# Patient Record
Sex: MALE | Race: BLACK OR AFRICAN AMERICAN | NOT HISPANIC OR LATINO | Employment: UNEMPLOYED | URBAN - METROPOLITAN AREA
[De-identification: names, ages, dates, MRNs, and addresses within clinical notes are randomized per-mention and may not be internally consistent; named-entity substitution may affect disease eponyms.]

---
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
## 2018-01-08 ENCOUNTER — TELEPHONE (OUTPATIENT)
Dept: PSYCHIATRY | Facility: CLINIC | Age: 18
End: 2018-01-08

## 2018-01-08 NOTE — TELEPHONE ENCOUNTER
-Lona MEDINA informed writer pt's injection was coming to be due and he did not have an appt scheduled.   -Writer talked with pt and informed him that it was due.  -Pt did not have phone number for [facility] and asked if writer could text it to him.    -writer asked Yomaira LENTZ to text the number to him   -writer asked pt if he was able to make appt and ride for the appt.  -Pt assured writer that he could do it  -writer informed pt that she will check in with him tomorrow to make sure ride and appt are made.

## 2018-01-11 NOTE — TELEPHONE ENCOUNTER
-writer call pt to assist with making appointment for injection   -writer received voicemail   -writer left a message asking pt to call clinic   -provided clinic number

## 2018-01-12 NOTE — TELEPHONE ENCOUNTER
-writer called pt as she saw that pt had scheduled his injection appointment   -pt reported that he also scheduled his ride  -writer transferred him to the  to also make a follow up med appointment with ADAM Purvis

## 2018-01-15 ENCOUNTER — ALLIED HEALTH/NURSE VISIT (OUTPATIENT)
Dept: PSYCHIATRY | Facility: CLINIC | Age: 18
End: 2018-01-15
Payer: MEDICAID

## 2018-01-15 DIAGNOSIS — F25.1 SCHIZOAFFECTIVE DISORDER, DEPRESSIVE TYPE (H): Primary | ICD-10-CM

## 2018-01-15 DIAGNOSIS — Z79.899 ENCOUNTER FOR LONG-TERM (CURRENT) USE OF MEDICATIONS: ICD-10-CM

## 2018-01-15 PROCEDURE — 25000128 H RX IP 250 OP 636: Mod: ZF

## 2018-01-15 PROCEDURE — 96372 THER/PROPH/DIAG INJ SC/IM: CPT | Mod: ZF

## 2018-01-15 NOTE — NURSING NOTE
"Rohan Ruggiero comes into clinic today at the request of Lona Spann Ordering Provider for Med Injection only Juvenal Hauser.    OBSERVATIONS    Prior to administration pt identified by name and :  Yes    1.  Appearance:  Clean and casual dress.  2.  Mood:  \"ok\"  3.  Affect:  Mood congruent  4.  Interactions:  Appropriate.  Pt reports feeling ill yesterday with congestion and sore throat.  Feels better today.  Encouraged rest and fluids.  Suggested a hot shower or sitting in bathroom while shower runs in order to hydrate sinuses, as pt does not have a humidifier.  Also, discussed uncertainty for when SLP site will be able to administer injections.  Will tentatively schedule pt here for next injection.  5.  Eye contact:  Good  6.  Side Effects:  Denies  7.  Education:  See above  8.  Level of Cooperation:  Good  9.  Compliance:  Full  10.  Next appointment:  18- provider, 18- injection      This service provided today was under the supervising provider of the day Dr. Henry, who was available if needed.    Zeny Nova    "

## 2018-01-15 NOTE — MR AVS SNAPSHOT
After Visit Summary   1/15/2018    Rohan Ruggiero    MRN: [id number]           Patient Information     Date Of Birth          2000        Visit Information        Provider Department      1/15/2018 1:00 PM Nurse, [facility] Psychiatry Psychiatry Clinic        Today's Diagnoses     Schizoaffective disorder, depressive type (H)    -  1    Encounter for long-term (current) use of medications           Follow-ups after your visit        Your next 10 appointments already scheduled     Arley 15, 2018  1:00 PM CST   Nurse Visit with [facility] Psychiatry Nurse   Psychiatry Clinic ([facility])    [address]  [address] [facility] [address]   [phone]            Jan 18, 2018 11:00 AM CST   Adult Med Follow UP with ADAM Purvis CNP   Psychiatry Clinic ([facility])    [address] [facility] [address] [phone]            Feb 12, 2018  1:00 PM CST   Nurse Visit with [facility] Psychiatry Nurse   Psychiatry Clinic ([facility])    [address] [facility] [address] [phone]              Who to contact     Please call your clinic at [phone] to:    Ask questions about your health    Make or cancel appointments    Discuss your medicines    Learn about your test results    Speak to your doctor   If you have compliments or concerns about an experience at your clinic, or if you wish to file a complaint, please contact [facility] Patient Relations at [phone] or email us at [email or web address]         Additional Information About Your Visit        MyChart Information     [employer] gives you secure access to your electronic health record. If you see a primary care provider, you can also send messages to your care team and make appointments. If you have questions, please call your primary care clinic.  If you  do not have a primary care provider, please call [phone] and they will assist you.      Syscor is an electronic gateway that provides easy, online access to your medical records. With Syscor, you can request a clinic appointment, read your test results, renew a prescription or communicate with your care team.     To access your existing account, please contact your [facility] or call [phone] for assistance.        Care EveryWhere ID     This is your Care EveryWhere ID. This could be used by other organizations to access your [place] medical records  [id number]         Blood Pressure from Last 3 Encounters:   12/14/17 100/64   08/21/17 110/72   08/07/17 129/79    Weight from Last 3 Encounters:   12/14/17 89.6 kg (197 lb 9.6 oz) (92 %)*   08/21/17 89.2 kg (196 lb 9.6 oz) (92 %)*   08/07/17 88.9 kg (196 lb) (92 %)*     * Growth percentiles are based on [facility] 2-20 Years data.              Today, you had the following     No orders found for display       Primary Care Provider Fax #    Physician No Ref-Primary [phone]       No address on file        Equal Access to Services     MIRIAM ALFARO : Hadii nj [place], waaxda lutomaszadaha, qaybta kaalmada adegiseleda, gilbert aiken. So [facility] [phone].    ATENCIÓN: Si habla español, tiene a colon disposición servicios gratuitos de asistencia lingüística. Llame al [phone].    We comply with applicable federal civil rights laws and [place] laws. We do not discriminate on the basis of race, color, national origin, age, disability, sex, sexual orientation, or gender identity.            Thank you!     Thank you for choosing PSYCHIATRY CLINIC  for your care. Our goal is always to provide you with excellent care. Hearing back from our patients is one way we can continue to improve our services. Please take a few minutes to complete the written survey that you may receive in the mail after your  visit with us. Thank you!             Your Updated Medication List - Protect others around you: Learn how to safely use, store and throw away your medicines at www.disposemymeds.org.          This list is accurate as of: 1/15/18 12:49 PM.  Always use your most recent med list.                   Brand Name Dispense Instructions for use Diagnosis    acetaminophen 325 MG tablet    TYLENOL    50 tablet    Take 1-2 tablets by mouth every 4 hours as needed for pain.    Balanitis       ibuprofen 600 MG tablet    ADVIL/MOTRIN    50 tablet    Take 1 tablet by mouth every 6 hours as needed for pain.    Balanitis       paliperidone 156 MG/ML Susp injection    INVEGA SUSTENNA    1 mL    Inject 1 mL (156 mg) into the muscle once for 1 dose Every 4 weeks    Schizoaffective disorder, depressive type (H)       risperiDONE 0.5 MG tablet    risperDAL    30 tablet    Take 1 tablet (0.5 mg) by mouth At Bedtime    Schizoaffective disorder, depressive type (H)       traZODone 50 MG tablet    DESYREL    60 tablet    Take one (1) to two (2) tabs at night as needed    Schizoaffective disorder, depressive type (H)

## 2018-01-18 ENCOUNTER — OFFICE VISIT (OUTPATIENT)
Dept: PSYCHIATRY | Facility: CLINIC | Age: 18
End: 2018-01-18
Attending: PSYCHIATRY & NEUROLOGY
Payer: MEDICAID

## 2018-01-18 VITALS
DIASTOLIC BLOOD PRESSURE: 61 MMHG | BODY MASS INDEX: 25.69 KG/M2 | HEART RATE: 82 BPM | SYSTOLIC BLOOD PRESSURE: 95 MMHG | WEIGHT: 197.4 LBS

## 2018-01-18 DIAGNOSIS — F25.1 SCHIZOAFFECTIVE DISORDER, DEPRESSIVE TYPE (H): Primary | ICD-10-CM

## 2018-01-18 PROCEDURE — G0463 HOSPITAL OUTPT CLINIC VISIT: HCPCS | Mod: ZF

## 2018-01-18 ASSESSMENT — PAIN SCALES - GENERAL: PAINLEVEL: NO PAIN (0)

## 2018-01-18 NOTE — NURSING NOTE
Chief Complaint   Patient presents with     Recheck Medication     Schizoaffective disorder, depressive type     Reviewed Allergies, Medications, Pharmacy, Smoking Status, and Pain Level   Obtained Weight, Blood Pressure, Heart Rate

## 2018-01-18 NOTE — MR AVS SNAPSHOT
After Visit Summary   1/18/2018    Rohan Ruggiero    MRN: [id number]           Patient Information     Date Of Birth          2000        Visit Information        Provider Department      1/18/2018 11:00 AM Lona Spann APRN CNP Psychiatry Clinic        Today's Diagnoses     Schizoaffective disorder, depressive type (H)    -  1      Care Instructions    Invega Sustenna 156mg qmonthly. Next appt is 2/12/18          Follow-ups after your visit        Follow-up notes from your care team     Return in about 1 month (around 2/18/2018).      Your next 10 appointments already scheduled     Feb 12, 2018  1:00 PM CST   Nurse Visit with [facility] Psychiatry Nurse   Psychiatry Clinic ([facility])    [address] [facility] [address] [phone]            Mar 22, 2018 11:00 AM CDT   Adult Med Follow UP with ADAM Purvis CNP   Psychiatry Clinic ([facility])    [address] [facility] [address] [phone]              Who to contact     Please call your clinic at [phone] to:    Ask questions about your health    Make or cancel appointments    Discuss your medicines    Learn about your test results    Speak to your doctor   If you have compliments or concerns about an experience at your clinic, or if you wish to file a complaint, please contact [facility] Patient Relations at [phone] or email us at [email or web address]         Additional Information About Your Visit        Phyllishart Information     Ybrainhart gives you secure access to your electronic health record. If you see a primary care provider, you can also send messages to your care team and make appointments. If you have questions, please call your primary care clinic.  If you do not have a primary care provider, please call [phone] and they will assist you.      Ghazala is an  electronic gateway that provides easy, online access to your medical records. With [employer] (FIG), you can request a clinic appointment, read your test results, renew a prescription or communicate with your care team.     To access your existing account, please contact your [facility] or call [phone] for assistance.        Care EveryWhere ID     This is your Care EveryWhere ID. This could be used by other organizations to access your [facility] medical records  [id number]        Your Vitals Were     Pulse BMI (Body Mass Index)                82 25.69 kg/m2           Blood Pressure from Last 3 Encounters:   01/18/18 95/61   12/14/17 100/64   08/21/17 110/72    Weight from Last 3 Encounters:   01/18/18 89.5 kg (197 lb 6.4 oz) (92 %)*   12/14/17 89.6 kg (197 lb 9.6 oz) (92 %)*   08/21/17 89.2 kg (196 lb 9.6 oz) (92 %)*     * Growth percentiles are based on [facility] 2-20 Years data.              Today, you had the following     No orders found for display       Primary Care Provider Fax #    Physician No Ref-Primary [phone]       No address on file        Equal Access to Services     DAYRON ALFARO : Hadii nj [place], waalexanderda martin, qaybta kaalmada penelope, gilbert claros . So [facility] [phone].    ATENCIÓN: Si habla español, tiene a colon disposición servicios gratuitos de asistencia lingüística. Marlenyame al [phone].    We comply with applicable federal civil rights laws and [place] laws. We do not discriminate on the basis of race, color, national origin, age, disability, sex, sexual orientation, or gender identity.            Thank you!     Thank you for choosing PSYCHIATRY CLINIC  for your care. Our goal is always to provide you with excellent care. Hearing back from our patients is one way we can continue to improve our services. Please take a few minutes to complete the written survey that you may receive in the mail after your visit with us.  Thank you!             Your Updated Medication List - Protect others around you: Learn how to safely use, store and throw away your medicines at www.disposemymeds.org.          This list is accurate as of: 1/18/18 11:52 AM.  Always use your most recent med list.                   Brand Name Dispense Instructions for use Diagnosis    acetaminophen 325 MG tablet    TYLENOL    50 tablet    Take 1-2 tablets by mouth every 4 hours as needed for pain.    Balanitis       ibuprofen 600 MG tablet    ADVIL/MOTRIN    50 tablet    Take 1 tablet by mouth every 6 hours as needed for pain.    Balanitis       paliperidone 156 MG/ML Susp injection    INVEGA SUSTENNA    1 mL    Inject 1 mL (156 mg) into the muscle once for 1 dose Every 4 weeks    Schizoaffective disorder, depressive type (H)       risperiDONE 0.5 MG tablet    risperDAL    30 tablet    Take 1 tablet (0.5 mg) by mouth At Bedtime    Schizoaffective disorder, depressive type (H)       traZODone 50 MG tablet    DESYREL    60 tablet    Take one (1) to two (2) tabs at night as needed    Schizoaffective disorder, depressive type (H)
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
## 2018-01-19 ASSESSMENT — PATIENT HEALTH QUESTIONNAIRE - PHQ9: SUM OF ALL RESPONSES TO PHQ QUESTIONS 1-9: 0

## 2018-01-23 NOTE — PROGRESS NOTES
[facility] NAVIGATE Program Treatment Plan Summary  A Part of the [facility] First Episode of Psychosis Program     NAVIGATE Enrollee: Rohan Ruggiero  /Age:  2000 (17 year old)    Date of Initial Service: 3/29/17  Date of INTIAL Treatment Plan: 17  Last Review/Update Date:  10/18/17   90-Day Review Date: 18    The following is a REVIEWED treatment plan?  Yes   The following is an UPDATED treatment plan?  Yes    Participants at Collaborative Treatment Planning Meeting:   Client    NAVIGATE IRT Clinician: ABRAM Tejada    1. DSM-V Diagnosis (include numeric code)  Schizoaffective disorder, depressed type (F25.1)    2. Current symptoms and circumstances that substantiate the diagnosis:  Rohan is experiencing visual and auditory hallucinations, as well as paranoia and delusional thinking.    3. How symptoms and/or behaviors are affecting level of function:   Rohan is unable to fully engage in school/work, social activities, family relationships, or his community.    4. Risk Assessment:  Suicide:  Assessed Level of Immediate Risk: Low  Ideation: No  Plan:  No  Means: No  Intent: No    Homicide/Violence:  Assessed Level of Immediate Risk: Low  Ideation: No  Plan: No  Means: No  Intent: No    If on a medication, please include name and dosage:    Current Outpatient Prescriptions   Medication     paliperidone (INVEGA SUSTENNA) 156 MG/ML SUSP injection     risperiDONE (RISPERDAL) 0.5 MG tablet     traZODone (DESYREL) 50 MG tablet     acetaminophen (TYLENOL) 325 MG tablet     ibuprofen (ADVIL,MOTRIN) 600 MG tablet     No current facility-administered medications for this visit.        5. Treatment Goals    Domain: Illness Management & Recovery  Goal: Identify and engage possible areas of improvement related to +/- symptoms, ability to manage illness, medications, and/or substance use/abuse, SI/SIB/HI    Objectives & Target Date     - Continue with mediation recommendations, Target Date: 18  - Complete a  safety plan with therapist and share with support system, Target Date: 1/18/18  - Verbalize an understanding of the motives for self-destructive behavior patterns, Target Date: 1/18/18  - Verbally report and demonstrate an increased sense of hope for self, Target Date: 1/18/18   - Identify positive traits and talents about self, Target Date: 1/18/18   - Identify negative automatic thoughts and replace them with positive self-talk messages to build self-esteem, Target Date: 1/18/18   - Verbalize an understanding of the underlying needs, conflicts, and emotions that support irrational beliefs, Target Date: 1/18/18    - Verbally identify the stressors that contribute to the reactive psychosis, Target Date: 1/18/18   - Report a diminishing or absence of hallucinations and/or delusions, Target Date: 1/18/18   - Decrease the frequency of somatic complaints, Target Date: 1/18/18   - Develop and implement relapse prevention strategies for managing possible future episodes of psychosis, Target Date: 1/18/18  - Increase communication with support system, resulting in feeling attended to and understood, Target Date: 1/18/18  - Learn and implement problem-solving and/or conflict resolution skills to manage personal and interpersonal problems constructively, Target Date: 1/18/18  - Family members verbalize increased understanding of an knowledge about Rohan s illness and treatment, Target Date: 1/18/18   - Family members terminate any hostile, critical responses to Rohan and increase their statements of praise, optimism, and affirmation, Target Date: 1/18/18   - Family members identify specific activities for Rohan that will facilitate development of positive self-esteem, Target Date: 1/18/18   - Family members verbalize realistic expectations of Rohan, Target Date: 1/18/18  - Learn and implement calming, communication, conflict resolution, and thinking skills as part of a new way to manage reactions to frustration, Target Date:  1/18/18    Strengths   - Bravery & Valor     - Caution, Prudence, & Discretion    - Curiosity    - Honest, Authentic, Genuine    - Industry, diligence, & perseverance    - Kind & Generous    - Modesty & Humility    - Self-control & Self-regulation    - Social Intelligence    - Spirituality, Sense of Purpose, Tea      Barriers  - Command Hallucinations  - Communication, limited to no communication with family/support network  - Coping, limited to no coping strategies  - Depression and/or Hopelessness  - Drug/Alcohol use/abuse/dependence  - Environmental Stress (e.g. family conflict or criticism) (S)  - Grandiose or Persecutory Delusions (S)  - Impulsive  - Isolation/Reduced Supervision (S)  - Male (S)  - Resources, limited to no financial, social, friend, familial, resource support   - SI/SIB/HI  - Single (S)  - Symptoms of psychosis, positive (delusions, hallucinations)  - Symptoms of psychosis, negative (flat affect, avolition, anhedonia, alogia, and/or apathy)  - Treatment Non-Adherence     Provider & Intervention  IRT  - Motivational Interviewing (Connect info and skills with personal goals, Promote hope and positive expectations, Explore pros and cons of change, Re-frame experiences in a positive light), Provided by: Yomaira Roberts  - Educational Teaching Strategies (Review written material/education on: Assessment/Initial Goal Setting, Education about Psychosis, Relapse Prevention Planning, Processing the Psychotic Episode, Developing Resiliency -Standard Sessions, Building a Bridging to Your Goals, Dealing with Negative Feelings, Coping with Symptoms, Substance Use, Having Fun and Developing Good Relationships, Making Choices about Smoking, Nutrition and Exercise, Developing Resiliency), Provided by: Yomaira Roberts  - CBT (Reinforcement and shaping, Social skills training, Relapse prevention planning, Coping skills training, Relaxation training, Cognitive restructuring, Behavioral tailoring), Provided by: Yomaira  Armando      Domain: Health & Basic Living Needs  Goal: Identify and engage possible areas of improvement related to basic needs being met and maintaining or improving overall health and well-being    Objectives & Target Date     - Learn and implement healthy nutritional practices, Target Date: 1/18/18   - Reestablish a consistent eating and sleeping pattern, Target Date: 1/18/18   - Establish a plan to increase physical exercise, Target Date: 1/18/18  - Explore motivation for treatment toward making a commitment to change, Target Date: 1/18/18   - Decrease the level of denial around using substances as evidenced by fewer statements about minimizing amount of use and its negative impact on life, Target Date: 1/18/18   - Verbalize increased knowledge of substance use and the process of recovery, Target Date: 1/18/18   - Verbalize a commitment to a harm reduction approach to using substances, Target Date: 1/18/18   - Verbalize an understanding of personal, social, and family factors that can contribute to development of chemical dependence and pose risks for relapse, Target Date: 1/18/18   - Identify and make changes in social relationships that will support recovery, Target Date: 1/18/18   - Learn and implement coping strategies to manage urges to use substances, Target Date: 1/18/18  - Implement relapse prevention strategies for managing possible future situations with high risk for relapse, Target Date: 1/18/18   - Develop a written aftercare plan that will support the maintenance of long-term sobriety, Target Date: 1/18/18  - Family members demonstrate support of Rohan's sobriety by minimizing environmental triggers for future substance use, Target Date: 1/18/18  - Family members agree to not financially support Rohan's substance use, Target Date: 1/18/18  - Family members verbally reinforce Rohan's active attempts to decrease substance by providing praise, encouragement, and affirmations, Target Date: 1/18/18      Strengths   - Caution, Prudence, & Discretion    - Curiosity    - Hope, Optimism, & future-mindedness    - Honest, Authentic, Genuine    - Humor & Playfulness     - Judgment, critical thinking, & open-mindedness    - Kind & Generous    - Self-control & Self-regulation    - Social Intelligence    - Spirituality, Sense of Purpose, Tea      Barriers   - Command Hallucinations  - Communication, limited to no communication with family/support network  - Coping, limited to no coping strategies  - Depression and/or Hopelessness  - Drug/Alcohol use/abuse/dependence  - Environmental Stress (e.g. family conflict or criticism) (S)  - Grandiose or Persecutory Delusions (S)  - Impulsive  - Isolation/Reduced Supervision (S)  - Male (S)  - Resources, limited to no financial, social, friend, familial, resource support   - SI/SIB/HI  - Single (S)  - Symptoms of psychosis, positive (delusions, hallucinations)  - Symptoms of psychosis, negative (flat affect, avolition, anhedonia, alogia, and/or apathy)  - Treatment Non-Adherence     Provider & Intervention  Prescriber  - Ongoing Nutrition/Exercise Education, Provided by: Yomaira Roberts and Lona Spann  - Side Effect Monitoring, Provided by: Lona Spann      Domain: Family & Other Supports  Goal: Identify and engage possible areas of improvement related to engaging family, friends and other supports    Objectives & Target Date     - Identify strengths and interests that can be used to initiate social contacts and develop peer friendships, Target Date: 1/18/18  - Learn and implement calming and coping strategies to manage anxiety symptoms and focus attention usefully during moments of social anxiety, Target Date: 1/18/18    - Strengthen the social support network with friends by initiating social contact and participating in social activities with peers, Target Date: 1/18/18   - Participate in family therapy, Target Date: 1/18/18  - Increase the frequency of positive  interactions with support network, Target Date: 1/18/18  - Increase participation in interpersonal or peer group activities, Target Date: 1/18/18   - Family members teach and reinforce healthy social skills and attitudes, Target Date: 1/18/18     Strengths  - Capacity to love and be loved    - Caution, Prudence, & Discretion    - Gratitude     - Hope, Optimism, & future-mindedness    - Honest, Authentic, Genuine    - Humor & Playfulness     - Industry, diligence, & perseverance    - Judgment, critical thinking, & open-mindedness    - Kind & Generous    - Modesty & Humility    - Perspective ([place])    - Social Intelligence    - Spirituality, Sense of Purpose, Tea    - Teamwork & Loyalty      Barriers   - Command Hallucinations  - Communication, limited to no communication with family/support network  - Coping, limited to no coping strategies  - Depression and/or Hopelessness  - Drug/Alcohol use/abuse/dependence  - Environmental Stress (e.g. family conflict or criticism) (S)  - Grandiose or Persecutory Delusions (S)  - Impulsive  - Isolation/Reduced Supervision (S)  - Resources, limited to no financial, social, friend, familial, resource support  - Symptoms of psychosis, positive (delusions, hallucinations)  - Symptoms of psychosis, negative (flat affect, avolition, anhedonia, alogia, and/or apathy)  - Treatment Non-Adherence     Provider & Intervention  Family Therapy  - Motivational Interviewing (Connect info and skills with personal goals, Promote hope and positive expectations, Explore pros and cons of change, Re-frame experiences in a positive light), Provided by: Wil Gaines  - Educational Teaching Strategies (Review written material/education on: Psychosis, Medications for psychosis, Coping with stress, Strategies to build resiliency, Relapse prevention planning, Developing a collaboration with mental health professionals, Effective communication, A relative s guide to supporting recovery from psychosis,  Basic facts about alcohol and drugs), Provided by: Wil Gaines  - CBT (Reinforcement and shaping, Social skills training, Relapse prevention planning, Coping skills training, Relaxation training, Cognitive restructuring, Behavioral tailoring), Provided by: Wil Gaines      Domain: Social, Academic, & Employment  Goal: Identify and engage possible areas of improvement related to education, employment, and social activities     Objectives & Target Date     - Explore areas of interest for employment purposes, Target Date: 1/18/18  - Obtain/maintain gainful employment, Target Date: 1/18/18   - Increase participation in interpersonal or peer group activities, Target Date: 1/18/18  - Increase frequency of completion of chores and household responsibilities, Target Date: 1/18/18  - Family members provide praise, encouragement for Rohan's attempts and successes at school/work, Target Date: 1/18/18     Strengths  - Capacity to love and be loved    - Caution, Prudence, & Discretion    - Creativity, Ingenuity, & Originality    - Hope, Optimism, & future-mindedness    - Honest, Authentic, Genuine    - Humor & Playfulness     - Kind & Generous    - Modesty & Humility    - Perspective ([place])    - Self-control & Self-regulation    - Social Intelligence    - Spirituality, Sense of Purpose, Tea    - Teamwork & Loyalty      Barriers  - Command Hallucinations  - Communication, limited to no communication with family/support network  - Coping, limited to no coping strategies  - Depression and/or Hopelessness  - Drug/Alcohol use/abuse/dependence  - Environmental Stress (e.g. family conflict or criticism) (S)  - Grandiose or Persecutory Delusions (S)  - Impulsive  - Isolation/Reduced Supervision (S)  - Resources, limited to no financial, social, friend, familial, resource support   - Symptoms of psychosis, positive (delusions, hallucinations)  - Symptoms of psychosis, negative (flat affect, avolition, anhedonia, alogia, and/or  "apathy)  - Symptoms of psychosis, cognitive (memory, attention and concentration, and/or executive functioning difficulties)  - Treatment Non-Adherence     Provider & Intervention   SEE  - Identify Level of Disclosure, Provided by: Abby Tilley   - Education Assistance & Supports (Discuss/plan use of student disability services, School searching, Interview preparation/skills training, Complete forms/applications, Follow along supports for requesting accommodations, development and use of natural supports, problem solving difficulties, stress management, develop/use of coping skills for symptoms, develop/use of coping skills for cognitive difficulties, social skills training), Provided by: Abby Tilley  - Employment Assistance & Supports (Information gathering/planning re: \"benefits applications\" or \"work incentive planning\", Job searching, Interview preparation/skills training, Complete resume, Complete applications, Follow along supports for requesting accommodations, development and use of natural supports, problem solving difficulties, stress management, develop/use of coping skills for symptoms, develop/use of coping skills for cognitive difficulties, social skills training), Provided by: Abby Tilley    6. Frequency of Sessions: Weekly    7. Expected duration of treatment:  1 year    8. Participants in therapy plan (family, friends, support network): Family, Abby Tilley (SEE), Yomaira Roberts (IRT), Wil Gaines (Family Clinician)      See scanned document for Acknowledgement of Current Treatment Plan    Regulatory Guidelines for Updating Treatment Plan  [place] Medical Assistance: Reviewed & signed at least every 90 days  Medicare:  Update per policy  "

## 2018-02-15 ENCOUNTER — TELEPHONE (OUTPATIENT)
Dept: PSYCHIATRY | Facility: CLINIC | Age: 18
End: 2018-02-15

## 2018-02-15 NOTE — TELEPHONE ENCOUNTER
-writer called and left a message with pt's foster mother as his phone number is not currently working.

## 2018-02-15 NOTE — TELEPHONE ENCOUNTER
-writer called and spoke with Pt regarding his missed injection appointment on the number provided by Lita.   -Pt had forgotten had had the appointment, writer provided the number for [facility] to make an appointment   -Pt stated that he will make the appointment.

## 2018-02-15 NOTE — TELEPHONE ENCOUNTER
-Writer called Lita back and was able to talk to her about pt.   -Lita stated that pt got a new number [phone] and that writer can call him on this number to discuss his appointments.

## 2018-02-15 NOTE — TELEPHONE ENCOUNTER
-writer noted that pt missed injection appointment on 2/12.   -writer attempted to call pt to ask him to reschedule an appointment to have injection done.    -writer's phone number is currently not accepting calls.

## 2018-02-15 NOTE — TELEPHONE ENCOUNTER
Maninder iWlliam LPN Rosewall Abby; Wil Gaines, [facility]; Lona Grady, RN                     Caller: Manisha Calvert     Relationship to Patient: foster?     Call Back Number: [phone]     Reason for Call: Manisha states Abby called her but that was all the message that she heard. Rohan has not lived with her since 11/1/17 though she still sees him all the time. If there is information you need, she might be able to help but wanted you to know this information. She is a teacher and unable to take calls during the day. Stated you could call back after 4pm if you needed something.   Thank you

## 2018-02-21 NOTE — TELEPHONE ENCOUNTER
-writer noted that pt still had not made an appointment for injection.  Writer called pt and talked with pt regarding his injection appointment.  Writer asked pt if he needed assistance in making appointment.  Pt stated that he does not need help, that he just forgot to make his appointment.  Writer again gave the phone number of clinic to make appointment.  Pt thanked writer for the reminder.

## 2018-03-01 ENCOUNTER — ALLIED HEALTH/NURSE VISIT (OUTPATIENT)
Dept: PSYCHIATRY | Facility: CLINIC | Age: 18
End: 2018-03-01
Payer: MEDICAID

## 2018-03-01 DIAGNOSIS — F25.1 SCHIZOAFFECTIVE DISORDER, DEPRESSIVE TYPE (H): Primary | ICD-10-CM

## 2018-03-01 PROCEDURE — 96372 THER/PROPH/DIAG INJ SC/IM: CPT | Mod: ZF

## 2018-03-01 PROCEDURE — 25000128 H RX IP 250 OP 636: Mod: ZF

## 2018-03-01 NOTE — NURSING NOTE
Writer has not heard back from Lona Grady at this time.    Pt informed that there was uncertainty if orders will be approved.  Provided pt with contact information for [facility] near his house and recommended that he contact them directly to inquire.  Pt agreeable to this.

## 2018-03-01 NOTE — MR AVS SNAPSHOT
After Visit Summary   3/1/2018    Rohan Ruggiero    MRN: [id number]           Patient Information     Date Of Birth          2000        Visit Information        Provider Department      3/1/2018 1:00 PM Nurse, [facility] Psychiatry Psychiatry Clinic        Today's Diagnoses     Schizoaffective disorder, depressive type (H)    -  1       Follow-ups after your visit        Your next 10 appointments already scheduled     Mar 22, 2018 11:00 AM CDT   Adult Med Follow UP with ADAM Purvis [facility]   Psychiatry Clinic ([facility])    [address]  [address] [facility] [id number] [phone]              Who to contact     Please call your clinic at [phone] to:    Ask questions about your health    Make or cancel appointments    Discuss your medicines    Learn about your test results    Speak to your doctor            Additional Information About Your Visit        MyChart Information     [employer] gives you secure access to your electronic health record. If you see a primary care provider, you can also send messages to your care team and make appointments. If you have questions, please call your primary care clinic.  If you do not have a primary care provider, please call [phone] and they will assist you.      [employer] is an electronic gateway that provides easy, online access to your medical records. With [employer], you can request a clinic appointment, read your test results, renew a prescription or communicate with your care team.     To access your existing account, please contact your [facility] or call [phone] for assistance.        Care EveryWhere ID     This is your Care EveryWhere ID. This could be used by other organizations to access your [place] medical records  [id number]         Blood Pressure from Last 3 Encounters:   01/18/18 95/61   12/14/17 100/64   08/21/17 110/72    Weight from Last 3 Encounters:    01/18/18 89.5 kg (197 lb 6.4 oz) (92 %)*   12/14/17 89.6 kg (197 lb 9.6 oz) (92 %)*   08/21/17 89.2 kg (196 lb 9.6 oz) (92 %)*     * Growth percentiles are based on [facility] 2-20 Years data.              Today, you had the following     No orders found for display       Primary Care Provider Fax #    Physician No Ref-Primary [phone]       No address on file        Equal Access to Services     MIRIAM ALFARO : Hadii aad ku hadasho Soomaali, waaxda luqadaha, qaybta kaalmada adeegyada, gilbert claros . So [facility] [phone].    ATENCIÓN: Si habla español, tiene a colon disposición servicios gratuitos de asistencia lingüística. Llame al [phone].    We comply with applicable federal civil rights laws and [place] laws. We do not discriminate on the basis of race, color, national origin, age, disability, sex, sexual orientation, or gender identity.            Thank you!     Thank you for choosing PSYCHIATRY CLINIC  for your care. Our goal is always to provide you with excellent care. Hearing back from our patients is one way we can continue to improve our services. Please take a few minutes to complete the written survey that you may receive in the mail after your visit with us. Thank you!             Your Updated Medication List - Protect others around you: Learn how to safely use, store and throw away your medicines at www.disposemymeds.org.          This list is accurate as of 3/1/18  1:29 PM.  Always use your most recent med list.                   Brand Name Dispense Instructions for use Diagnosis    acetaminophen 325 MG tablet    TYLENOL    50 tablet    Take 1-2 tablets by mouth every 4 hours as needed for pain.    Balanitis       ibuprofen 600 MG tablet    ADVIL/MOTRIN    50 tablet    Take 1 tablet by mouth every 6 hours as needed for pain.    Balanitis       paliperidone 156 MG/ML Susp injection    INVEGA SUSTENNA    1 mL    Inject 1 mL (156 mg) into the muscle once for 1 dose Every 4  weeks    Schizoaffective disorder, depressive type (H)       risperiDONE 0.5 MG tablet    risperDAL    30 tablet    Take 1 tablet (0.5 mg) by mouth At Bedtime    Schizoaffective disorder, depressive type (H)       traZODone 50 MG tablet    DESYREL    60 tablet    Take one (1) to two (2) tabs at night as needed    Schizoaffective disorder, depressive type (H)

## 2018-03-01 NOTE — NURSING NOTE
"Rohan Ruggiero comes into clinic today at the request of Lona Spann Ordering Provider for Med Injection only Juvenal Hauser.    OBSERVATIONS    Prior to administration pt identified by name and :  Yes    1.  Appearance:  Casual dress and weather appropriate.  2.  Mood:  \"good\"  3.  Affect:  Mood congruent  4.  Interactions:  Appropriate.  Pt late for injection by ~2 weeks.  Reports stability of mood and psychosis sx.  Requests STD testing as had previously had in clinic.  Denies any current sx of concern.  Writer stated would attempt to reach his SLP providers.  Call placed following injection to RN- Lona Grady who is reaching out to covering provider Dr. Dunbar.  5.  Eye contact:  Good  6.  Side Effects:  Denies  7.  Education:  Good  8.  Level of Cooperation:  Full  9.  Compliance:  Full  10.  Next appointment:  3/22/18- Zana    This service provided today was under the supervising provider of the day Dr. Henry, who was available if needed.    Zeny Nova    "

## 2018-04-03 ENCOUNTER — TELEPHONE (OUTPATIENT)
Dept: PSYCHIATRY | Facility: CLINIC | Age: 18
End: 2018-04-03

## 2018-04-03 NOTE — TELEPHONE ENCOUNTER
-Writer called and spoke with Rohan regarding his appointments.    -Rohan was aware that he missed his appointment on Friday and reported that he did try to call [facility] to find out when his appointment was, but he was on hold for a long period of time so he eventually hung up.  -Writer also let him know he missed appointment on 3/22 with Lona Spann, Rohan reported that he did not get a reminder phone call, encouraged him to call and reschedule appointments for injection and med appointment and speak with clinic staff there about reminder phone calls, as writer is at a different clinic.   -Rohan reported that he will call clinic and make appointments.

## 2018-04-06 ENCOUNTER — ALLIED HEALTH/NURSE VISIT (OUTPATIENT)
Dept: PSYCHIATRY | Facility: CLINIC | Age: 18
End: 2018-04-06
Payer: MEDICAID

## 2018-04-06 DIAGNOSIS — Z79.899 ENCOUNTER FOR LONG-TERM (CURRENT) USE OF MEDICATIONS: ICD-10-CM

## 2018-04-06 DIAGNOSIS — F25.1 SCHIZOAFFECTIVE DISORDER, DEPRESSIVE TYPE (H): Primary | ICD-10-CM

## 2018-04-06 PROCEDURE — 96372 THER/PROPH/DIAG INJ SC/IM: CPT | Mod: ZF

## 2018-04-06 PROCEDURE — 25000128 H RX IP 250 OP 636: Mod: ZF

## 2018-04-06 NOTE — NURSING NOTE
"Rohan Ruggiero comes into clinic today at the request of Lona Spann Ordering Provider for Med Injection only Invega Sustenna 156 mg.    OBSERVATIONS    Prior to administration pt identified by name and :  Yes    1.  Appearance:  Clean and casual dress, hair growing out.  2.  Mood:  \"good\"  3.  Affect:  Restricted  4.  Interactions:  Appropriate.  Pt asks about \"blood test\" for STI's.  Writer reminded pt of last discussion of this.  [facility] with [facility].  Denies a need for urgent testing, denies any sx.  Plans to discuss at next appt with Lona Spann.  Pt mentions missed appts as did not receive reminder calls.  Pt's phone number under demographics updated.  Scheduling assisted with text alert reminders.  5.  Eye contact:  Good  6.  Side Effects:  Denies  7.  Education:  None needed  8.  Level of Cooperation:  Good  9.  Compliance:  Full  10.  Next appointment:  5/3/18- provider, injection      This service provided today was under the supervising provider of the day Dr. Henry, who was available if needed.    Zeny Nova    "
Normal

## 2018-04-06 NOTE — MR AVS SNAPSHOT
After Visit Summary   4/6/2018    Rohan Ruggiero    MRN: [id number]           Patient Information     Date Of Birth          2000        Visit Information        Provider Department      4/6/2018 1:30 PM Nurse, [email or web address] Psychiatry Psychiatry Clinic        Today's Diagnoses     Schizoaffective disorder, depressive type (H)    -  1    Encounter for long-term (current) use of medications           Follow-ups after your visit        Your next 10 appointments already scheduled     May 03, 2018 12:30 PM CDT   Adult Med Follow UP with ADAM Purvis CNP   Psychiatry Clinic ([facility])    [place] [id number] [address] [phone]            May 03, 2018  1:00 PM CDT   Nurse Visit with [email or web address] Psychiatry Nurse   Psychiatry Clinic ([facility])    [place] [id number]  [facility] [address] [phone]              Who to contact     Please call your clinic at [phone] to:    Ask questions about your health    Make or cancel appointments    Discuss your medicines    Learn about your test results    Speak to your doctor            Additional Information About Your Visit        [employer] Information     [employer] gives you secure access to your electronic health record. If you see a primary care provider, you can also send messages to your care team and make appointments. If you have questions, please call your primary care clinic.  If you do not have a primary care provider, please call [phone] and they will assist you.      [employer] is an electronic gateway that provides easy, online access to your medical records. With [employer], you can request a clinic appointment, read your test results, renew a prescription or communicate with your care team.     To access your existing account, please contact your [facility] or call [phone] for assistance.         Care EveryWhere ID     This is your Care EveryWhere ID. This could be used by other organizations to access your [place] medical records  [id number]         Blood Pressure from Last 3 Encounters:   01/18/18 95/61   12/14/17 100/64   08/21/17 110/72    Weight from Last 3 Encounters:   01/18/18 89.5 kg (197 lb 6.4 oz) (92 %)*   12/14/17 89.6 kg (197 lb 9.6 oz) (92 %)*   08/21/17 89.2 kg (196 lb 9.6 oz) (92 %)*     * Growth percentiles are based on [facility] 2-20 Years data.              Today, you had the following     No orders found for display       Primary Care Provider Fax #    Physician No Ref-Primary [phone]       No address on file        Equal Access to Services     MIRIAM ALFARO : Garo York, wajason salazar, qajoslyn kaalmajustin negrete, gilbert claros . So [facility] [phone].    ATENCIÓN: Si habla español, tiene a colon disposición servicios gratuitos de asistencia lingüística. Marlenyame al [phone].    We comply with applicable federal civil rights laws and [place] laws. We do not discriminate on the basis of race, color, national origin, age, disability, sex, sexual orientation, or gender identity.            Thank you!     Thank you for choosing PSYCHIATRY CLINIC  for your care. Our goal is always to provide you with excellent care. Hearing back from our patients is one way we can continue to improve our services. Please take a few minutes to complete the written survey that you may receive in the mail after your visit with us. Thank you!             Your Updated Medication List - Protect others around you: Learn how to safely use, store and throw away your medicines at www.disposemymeds.org.          This list is accurate as of 4/6/18  1:36 PM.  Always use your most recent med list.                   Brand Name Dispense Instructions for use Diagnosis    acetaminophen 325 MG tablet    TYLENOL    50 tablet    Take 1-2 tablets by mouth every 4 hours as needed for  pain.    Balanitis       ibuprofen 600 MG tablet    ADVIL/MOTRIN    50 tablet    Take 1 tablet by mouth every 6 hours as needed for pain.    Balanitis       paliperidone 156 MG/ML Susp injection    INVEGA SUSTENNA    1 mL    Inject 1 mL (156 mg) into the muscle once for 1 dose Every 4 weeks    Schizoaffective disorder, depressive type (H)       risperiDONE 0.5 MG tablet    risperDAL    30 tablet    Take 1 tablet (0.5 mg) by mouth At Bedtime    Schizoaffective disorder, depressive type (H)       traZODone 50 MG tablet    DESYREL    60 tablet    Take one (1) to two (2) tabs at night as needed    Schizoaffective disorder, depressive type (H)

## 2018-05-11 ENCOUNTER — TELEPHONE (OUTPATIENT)
Dept: PSYCHIATRY | Facility: CLINIC | Age: 18
End: 2018-05-11

## 2018-05-11 NOTE — TELEPHONE ENCOUNTER
-Writer attempted to call patient as he missed med appointment and injection appointment with ADAM Purvis PMHNP-BC yesterday.  Writer received voicemail.  Left message asking patient to return call to clinic.

## 2018-05-21 NOTE — TELEPHONE ENCOUNTER
-Called and spoke with Rohan.  He is still interested in receiving services, but was busy due to a move.  He informed writer that he will call tomorrow morning and make an injection appointment.

## 2018-06-07 ENCOUNTER — TELEPHONE (OUTPATIENT)
Dept: PSYCHIATRY | Facility: CLINIC | Age: 18
End: 2018-06-07

## 2018-06-07 NOTE — TELEPHONE ENCOUNTER
-Writer called patient again as he has not scheduled his appointment for injection and [facility]  Rohan states he is at work right now and is going to put a reminder in his phone to call and make the appointments when he is done working.  Rohan reported he did not need further help from writer to remember his appointments.

## 2018-06-27 NOTE — PROGRESS NOTES
NAVIGATE SEE Outgoing Telephone Call  For Supported Employment & Education    NAVIGATE Enrollee: Spring Ruggiero (2000)     MRN: [id number]  Date of Call: 6/27/2018  Contacted: spring    Discussed:   Cancelled appt but discussed jobs found for him that we can discuss at next appt. Spring was open to this.    Abby Tilley

## 2018-07-02 ENCOUNTER — TELEPHONE (OUTPATIENT)
Dept: PSYCHIATRY | Facility: CLINIC | Age: 18
End: 2018-07-02

## 2018-07-02 NOTE — TELEPHONE ENCOUNTER
Received TORB from ADAM Purvis CNP to administer paliperidone (INVEGA SUSTENNA) 156 MG/ML SUSP, despite the pt being overdue. According to the provider, the pt needs to be seen at upcoming appt to receive further injections. She wanted this reiterated to the pt today. Writer passed along this message to Bee Valdez LPN.

## 2018-09-17 ENCOUNTER — OFFICE VISIT (OUTPATIENT)
Dept: PSYCHIATRY | Facility: CLINIC | Age: 18
End: 2018-09-17
Attending: PSYCHIATRY & NEUROLOGY
Payer: MEDICAID

## 2018-09-17 ENCOUNTER — ALLIED HEALTH/NURSE VISIT (OUTPATIENT)
Dept: PSYCHIATRY | Facility: CLINIC | Age: 18
End: 2018-09-17
Attending: NURSE PRACTITIONER
Payer: MEDICAID

## 2018-09-17 VITALS
HEART RATE: 52 BPM | SYSTOLIC BLOOD PRESSURE: 109 MMHG | WEIGHT: 175 LBS | DIASTOLIC BLOOD PRESSURE: 74 MMHG | BODY MASS INDEX: 22.78 KG/M2

## 2018-09-17 DIAGNOSIS — F25.1 SCHIZOAFFECTIVE DISORDER, DEPRESSIVE TYPE (H): ICD-10-CM

## 2018-09-17 DIAGNOSIS — F22 PARANOID DELUSION (H): Primary | ICD-10-CM

## 2018-09-17 LAB
ALBUMIN SERPL-MCNC: 3.8 G/DL (ref 3.4–5)
ALP SERPL-CCNC: 93 U/L (ref 65–260)
ALT SERPL W P-5'-P-CCNC: 18 U/L (ref 0–50)
ANION GAP SERPL CALCULATED.3IONS-SCNC: 9 MMOL/L (ref 3–14)
AST SERPL W P-5'-P-CCNC: 14 U/L (ref 0–35)
BASOPHILS # BLD AUTO: 0 10E9/L (ref 0–0.2)
BASOPHILS NFR BLD AUTO: 0.3 %
BILIRUB SERPL-MCNC: 1.3 MG/DL (ref 0.2–1.3)
BUN SERPL-MCNC: 8 MG/DL (ref 7–30)
CALCIUM SERPL-MCNC: 9.1 MG/DL (ref 8.5–10.1)
CHLORIDE SERPL-SCNC: 104 MMOL/L (ref 98–110)
CHOLEST SERPL-MCNC: 114 MG/DL
CO2 SERPL-SCNC: 29 MMOL/L (ref 20–32)
CREAT SERPL-MCNC: 1.06 MG/DL (ref 0.5–1)
DIFFERENTIAL METHOD BLD: ABNORMAL
EOSINOPHIL # BLD AUTO: 0.1 10E9/L (ref 0–0.7)
EOSINOPHIL NFR BLD AUTO: 2.4 %
ERYTHROCYTE [DISTWIDTH] IN BLOOD BY AUTOMATED COUNT: 13.8 % (ref 10–15)
GFR SERPL CREATININE-BSD FRML MDRD: 89 ML/MIN/1.7M2
GLUCOSE SERPL-MCNC: 81 MG/DL (ref 70–99)
HBA1C MFR BLD: 5.2 % (ref 0–5.6)
HCT VFR BLD AUTO: 48.5 % (ref 40–53)
HDLC SERPL-MCNC: 47 MG/DL
HGB BLD-MCNC: 15.8 G/DL (ref 13.3–17.7)
IMM GRANULOCYTES # BLD: 0 10E9/L (ref 0–0.4)
IMM GRANULOCYTES NFR BLD: 0.3 %
LDLC SERPL CALC-MCNC: 57 MG/DL
LYMPHOCYTES # BLD AUTO: 1.9 10E9/L (ref 0.8–5.3)
LYMPHOCYTES NFR BLD AUTO: 55.4 %
MCH RBC QN AUTO: 28.1 PG (ref 26.5–33)
MCHC RBC AUTO-ENTMCNC: 32.6 G/DL (ref 31.5–36.5)
MCV RBC AUTO: 86 FL (ref 78–100)
MONOCYTES # BLD AUTO: 0.3 10E9/L (ref 0–1.3)
MONOCYTES NFR BLD AUTO: 8.3 %
NEUTROPHILS # BLD AUTO: 1.1 10E9/L (ref 1.6–8.3)
NEUTROPHILS NFR BLD AUTO: 33.3 %
NONHDLC SERPL-MCNC: 67 MG/DL
NRBC # BLD AUTO: 0 10*3/UL
NRBC BLD AUTO-RTO: 0 /100
PLATELET # BLD AUTO: 170 10E9/L (ref 150–450)
POTASSIUM SERPL-SCNC: 4.3 MMOL/L (ref 3.4–5.3)
PROT SERPL-MCNC: 7.4 G/DL (ref 6.8–8.8)
RBC # BLD AUTO: 5.62 10E12/L (ref 4.4–5.9)
SODIUM SERPL-SCNC: 142 MMOL/L (ref 133–144)
TRIGL SERPL-MCNC: 51 MG/DL
WBC # BLD AUTO: 3.4 10E9/L (ref 4–11)

## 2018-09-17 PROCEDURE — 80061 LIPID PANEL: CPT | Performed by: STUDENT IN AN ORGANIZED HEALTH CARE EDUCATION/TRAINING PROGRAM

## 2018-09-17 PROCEDURE — 86780 TREPONEMA PALLIDUM: CPT | Performed by: STUDENT IN AN ORGANIZED HEALTH CARE EDUCATION/TRAINING PROGRAM

## 2018-09-17 PROCEDURE — 25000128 H RX IP 250 OP 636: Mod: ZF

## 2018-09-17 PROCEDURE — 86803 HEPATITIS C AB TEST: CPT | Performed by: STUDENT IN AN ORGANIZED HEALTH CARE EDUCATION/TRAINING PROGRAM

## 2018-09-17 PROCEDURE — 96372 THER/PROPH/DIAG INJ SC/IM: CPT | Mod: ZF

## 2018-09-17 PROCEDURE — 36415 COLL VENOUS BLD VENIPUNCTURE: CPT | Performed by: STUDENT IN AN ORGANIZED HEALTH CARE EDUCATION/TRAINING PROGRAM

## 2018-09-17 PROCEDURE — 80053 COMPREHEN METABOLIC PANEL: CPT | Performed by: STUDENT IN AN ORGANIZED HEALTH CARE EDUCATION/TRAINING PROGRAM

## 2018-09-17 PROCEDURE — 87491 CHLMYD TRACH DNA AMP PROBE: CPT | Performed by: STUDENT IN AN ORGANIZED HEALTH CARE EDUCATION/TRAINING PROGRAM

## 2018-09-17 PROCEDURE — 87535 HIV-1 PROBE&REVERSE TRNSCRPJ: CPT | Performed by: STUDENT IN AN ORGANIZED HEALTH CARE EDUCATION/TRAINING PROGRAM

## 2018-09-17 PROCEDURE — G0463 HOSPITAL OUTPT CLINIC VISIT: HCPCS | Mod: 25

## 2018-09-17 PROCEDURE — 83036 HEMOGLOBIN GLYCOSYLATED A1C: CPT | Performed by: STUDENT IN AN ORGANIZED HEALTH CARE EDUCATION/TRAINING PROGRAM

## 2018-09-17 PROCEDURE — 87591 N.GONORRHOEAE DNA AMP PROB: CPT | Performed by: STUDENT IN AN ORGANIZED HEALTH CARE EDUCATION/TRAINING PROGRAM

## 2018-09-17 PROCEDURE — 85025 COMPLETE CBC W/AUTO DIFF WBC: CPT | Performed by: STUDENT IN AN ORGANIZED HEALTH CARE EDUCATION/TRAINING PROGRAM

## 2018-09-17 ASSESSMENT — PAIN SCALES - GENERAL: PAINLEVEL: NO PAIN (0)

## 2018-09-17 NOTE — Clinical Note
Lona,  In add'n to calling/texting the pt, would it be possible to alert his  about upcoming appts for Rohan? Rohan had suggested that this would be helpful to him.  Thanks!

## 2018-09-17 NOTE — PATIENT INSTRUCTIONS
Rohan,    Thanks for coming in today. Here is a summary of our visit:    - Continue the medication.  - Please plan to see Lona again for follow-up appointments. She can help you get off of your medication.  - Head to lab (downstairs behind the elevator) for a blood and urine test.  - There is a young adult psychosis group on Friday's at 3PM. No appointments necessary.    Thanks,    Dr. GALLARDO

## 2018-09-17 NOTE — MR AVS SNAPSHOT
After Visit Summary   9/17/2018    Rohan Ruggiero    MRN: [id number]           Patient Information     Date Of Birth          2000        Visit Information        Provider Department      9/17/2018 1:30 PM Nurse, [facility] Psychiatry [facility]        Care Instructions    Thank you for coming to the PSYCHIATRY CLINIC.    Lab Testing:  If you had lab testing today and your results are reassuring or normal they will be mailed to you or sent through [employer] within 7 days.   If the lab tests need quick action we will call you with the results.  The phone number we will call with results is # [phone] (home) . If this is not the best number please call our clinic and change the number.    Medication Refills:  If you need any refills please call your pharmacy and they will contact us. Our fax number for refills is [phone]. Please allow three business for refill processing.   If you need to  your refill at a new pharmacy, please contact the new pharmacy directly. The new pharmacy will help you get your medications transferred.     Scheduling:  If you have any concerns about today's visit or wish to schedule another appointment please call our office during normal business hours [phone] (8-5:00 M-F)    Contact Us:  Please call [phone] during business hours (8-5:00 M-F).  If after clinic hours, or on the weekend, please call  [phone].    Financial Assistance [phone]  [facility] Billing [phone]  [facility]illing [phone]  Medical Records [phone]      MENTAL HEALTH CRISIS NUMBERS:  [facility]:   [facility] - [phone]   Crisis Residence [facility] - [phone]   Walk-In Counseling Center [facility] - [phone]   COPE 24/7 [place] Mobile Team for Adults - [[phone]]; Child - [[phone]]     Crisis Connection - [phone]     [facility]:   [facility] - [phone]   Walk-in counseling [facility]  [facility] - [phone]   Walk-in counseling  [facility] - [phone]   Crisis Residence  [facility]re[facility] - [phone]   Urgent Care Adult Mental Health:   --Drop-in, 24/7 crisis line, and [place] Mobile Team [[phone]]    CRISIS TEXT LINE: Text 989-342 from anywhere, anytime, any crisis 24/7;    OR SEE www.crisistextline.org     Poison Control Center - [phone]    CHILD: [facility] needs assessment team - [phone]     Trans Lifeline - 1-332.620.3460; or [employer] Lifeline - 1-326.495.2009    If you have a medical emergency please call 911or go to the nearest ER.                    _____________________________________________    Again thank you for choosing PSYCHIATRY CLINIC and please let us know how we can best partner with you to improve you and your family's health.  You may be receiving a survey in the mail regarding this appointment. We would love to have your feedback, both positive and negative, so please fill out the survey and return it using the provided envelope. The survey is done by an external company, so your answers are anonymous.             Follow-ups after your visit        Your next 10 appointments already scheduled     Sep 24, 2018  1:30 PM CDT   Nurse Visit with [facility] Psychiatry Nurse   Psychiatry Clinic ([facility])    [address] [phone]            Oct 22, 2018  1:00 PM CDT   Adult Med Follow UP with Gee Kirkpatrick MD   Psychiatry Clinic ([facility])    [address] [address] [phone]            Oct 22, 2018  1:30 PM CDT   Nurse Visit with [facility] Psychiatry Nurse   Psychiatry Clinic ([facility])    [address] [phone]              Future tests that were  ordered for you today     Open Future Orders        Priority Expected Expires Ordered    Lipid panel reflex to direct LDL Fasting Routine 9/17/2018 10/1/2018 9/17/2018            Who to contact     Please call your clinic at [phone] to:    Ask questions about your health    Make or cancel appointments    Discuss your medicines    Learn about your test results    Speak to your doctor            Additional Information About Your Visit        [employer] Information     [employer] gives you secure access to your electronic health record. If you see a primary care provider, you can also send messages to your care team and make appointments. If you have questions, please call your primary care clinic.  If you do not have a primary care provider, please call [phone] and they will assist you.      [employer] is an electronic gateway that provides easy, online access to your medical records. With [employer], you can request a clinic appointment, read your test results, renew a prescription or communicate with your care team.     To access your existing account, please contact your [facility] or call [phone] for assistance.        Care EveryWhere ID     This is your Care EveryWhere ID. This could be used by other organizations to access your [place] medical records  [id number]         Blood Pressure from Last 3 Encounters:   09/17/18 109/74   01/18/18 95/61   12/14/17 100/64    Weight from Last 3 Encounters:   09/17/18 79.4 kg (175 lb) (75 %)*   01/18/18 89.5 kg (197 lb 6.4 oz) (92 %)*   12/14/17 89.6 kg (197 lb 9.6 oz) (92 %)*     * Growth percentiles are based on CDC 2-20 Years data.              Today, you had the following     No orders found for display         Where to get your medicines      Some of these will need a paper prescription and others can be bought over the counter.  Ask your nurse if you have questions.     You don't need a prescription for these medications      paliperidone 156 MG/ML Susp injection          Primary Care Provider Fax #    Physician No Ref-Primary [phone]       No address on file        Equal Access to Services     MIRIAM ALFARO : Garo nj puente mansi [place], carrillo carmengracie, phil negrete, gilbert aiken. So [facility] [phone].    ATENCIÓN: Si habla español, tiene a colon disposición servicios gratuitos de asistencia lingüística. Llame al [phone].    We comply with applicable federal civil rights laws and [place] laws. We do not discriminate on the basis of race, color, national origin, age, disability, sex, sexual orientation, or gender identity.            Thank you!     Thank you for choosing PSYCHIATRY CLINIC  for your care. Our goal is always to provide you with excellent care. Hearing back from our patients is one way we can continue to improve our services. Please take a few minutes to complete the written survey that you may receive in the mail after your visit with us. Thank you!             Your Updated Medication List - Protect others around you: Learn how to safely use, store and throw away your medicines at www.disposemymeds.org.          This list is accurate as of 9/17/18  2:16 PM.  Always use your most recent med list.                   Brand Name Dispense Instructions for use Diagnosis    acetaminophen 325 MG tablet    TYLENOL    50 tablet    Take 1-2 tablets by mouth every 4 hours as needed for pain.    Balanitis       ibuprofen 600 MG tablet    ADVIL/MOTRIN    50 tablet    Take 1 tablet by mouth every 6 hours as needed for pain.    Balanitis       paliperidone 156 MG/ML Susp injection    INVEGA SUSTENNA    1 mL    Inject 1 mL (156 mg) into the muscle once for 1 dose Every 4 weeks    Schizoaffective disorder, depressive type (H)       risperiDONE 0.5 MG tablet    risperDAL    30 tablet    Take 1 tablet (0.5 mg) by mouth At Bedtime    Schizoaffective disorder, depressive type (H)        traZODone 50 MG tablet    DESYREL    60 tablet    Take one (1) to two (2) tabs at night as needed    Schizoaffective disorder, depressive type (H)
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
## 2018-09-17 NOTE — PROGRESS NOTES
This encounter created in error.   See separate Psy Prog Note of 9/17/18 for details from today's appointment.

## 2018-09-17 NOTE — PATIENT INSTRUCTIONS
Thank you for coming to the PSYCHIATRY CLINIC.    Lab Testing:  If you had lab testing today and your results are reassuring or normal they will be mailed to you or sent through [employer] within 7 days.   If the lab tests need quick action we will call you with the results.  The phone number we will call with results is # [phone] (home) . If this is not the best number please call our clinic and change the number.    Medication Refills:  If you need any refills please call your pharmacy and they will contact us. Our fax number for refills is [phone]. Please allow three business for refill processing.   If you need to  your refill at a new pharmacy, please contact the new pharmacy directly. The new pharmacy will help you get your medications transferred.     Scheduling:  If you have any concerns about today's visit or wish to schedule another appointment please call our office during normal business hours [phone] (8-5:00 M-F)    Contact Us:  Please call [phone] during business hours (8-5:00 M-F).  If after clinic hours, or on the weekend, please call  [phone].    Financial Assistance [phone]  [employer] Billing [phone]  [employer] Billing [phone]  Medical Records [id number]      MENTAL HEALTH CRISIS NUMBERS:  [facility]:   [facility] - [phone]   Crisis Residence [facility] - [phone]   Walk-In Counseling [facility] [phone]   COPE 24/7 [place] Mobile Team for Adults - [[phone]]; Child - [[phone]]     Crisis Connection - [phone]     [facility]:   [facility] - [phone]   Walk-in counseling [facility] - [phone]   Walk-in counseling [facility] - [phone]   Crisis Residence [facility] - [phone]   Urgent Care Adult Mental Health:   --Drop-in, 24/7 crisis line, and [place] Mobile Team [[phone]]    CRISIS TEXT  LINE: Text 741-359 from anywhere, anytime, any crisis 24/7;    OR SEE www.crisistextline.org     Poison Control Center - [phone]    CHILD: [facility] needs assessment team - [phone]     [facility] - [phone]; or [employer] Lifeline - [phone]    If you have a medical emergency please call 911or go to the nearest ER.                    _____________________________________________    Again thank you for choosing PSYCHIATRY CLINIC and please let us know how we can best partner with you to improve you and your family's health.  You may be receiving a survey in the mail regarding this appointment. We would love to have your feedback, both positive and negative, so please fill out the survey and return it using the provided envelope. The survey is done by an external company, so your answers are anonymous.

## 2018-09-17 NOTE — MR AVS SNAPSHOT
After Visit Summary   9/17/2018    Rohan Ruggiero    MRN: [id number]           Patient Information     Date Of Birth          2000        Visit Information        Provider Department      9/17/2018 1:00 PM Gee Kirkpatrick MD Psychiatry Clinic        Today's Diagnoses     Schizoaffective disorder, depressive type (H)          Care Instructions    Rohan,    Thanks for coming in today. Here is a summary of our visit:    - Continue the medication.  - Please plan to see Lona again for follow-up appointments. She can help you get off of your medication.  - Head to lab (downstairs behind the elevator) for a blood and urine test.  - There is a young adult psychosis group on Friday's at 3PM. No appointments necessary.    Thanks,    Dr. GALLARDO          Follow-ups after your visit        Your next 10 appointments already scheduled     Oct 22, 2018  1:30 PM CDT   Nurse Visit with [facility] Psychiatry Nurse   Psychiatry Clinic ([facility])    [place] [id number]  [address] [place] [address] [phone]            Dec 06, 2018 11:30 AM CST   Adult Med Follow UP with ADAM Purvis CNP   Psychiatry Clinic ([facility])    [place] [id number]  [address] [place] [address] [phone]              Who to contact     Please call your clinic at [phone] to:    Ask questions about your health    Make or cancel appointments    Discuss your medicines    Learn about your test results    Speak to your doctor            Additional Information About Your Visit        [employer] Information     [employer] gives you secure access to your electronic health record. If you see a primary care provider, you can also send messages to your care team and make appointments. If you have questions, please call your primary care clinic.  If you do not have a primary care provider, please call [phone] and they will assist you.       [employer] is an electronic gateway that provides easy, online access to your medical records. With [employer], you can request a clinic appointment, read your test results, renew a prescription or communicate with your care team.     To access your existing account, please contact your [facility] or call [phone] for assistance.        Care EveryWhere ID     This is your Care EveryWhere ID. This could be used by other organizations to access your [place] medical records  [id number]        Your Vitals Were     Pulse BMI (Body Mass Index)                52 22.78 kg/m2           Blood Pressure from Last 3 Encounters:   09/17/18 109/74   01/18/18 95/61   12/14/17 100/64    Weight from Last 3 Encounters:   09/17/18 79.4 kg (175 lb) (75 %)*   01/18/18 89.5 kg (197 lb 6.4 oz) (92 %)*   12/14/17 89.6 kg (197 lb 9.6 oz) (92 %)*     * Growth percentiles are based on [facility] 2-20 Years data.              We Performed the Following     CBC with Platelets Differential ([facility])     Chlamydia trachomatis PCR     Comprehensive Metabolic Panel     Hemoglobin A1c     Hepatitis C Screen Reflex to HCV RNA Quant and Genotype     HIV 1 Proviral DNA PCR Qualitative     Lipid panel reflex to direct LDL Fasting     Neisseria gonorrhoeae PCR     Treponema Abs w Reflex to RPR and Titer          Where to get your medicines      Some of these will need a paper prescription and others can be bought over the counter.  Ask your nurse if you have questions.     You don't need a prescription for these medications     paliperidone 156 MG/ML Susp injection          Primary Care Provider Fax #    Physician No Ref-Primary [phone]       No address on file        Equal Access to Services     MIRIAM ALFARO : Garo [place], waalexanderda luqadaha, qaybta kaalmagilbert moore. So [facility] [phone].    ATENCIÓN: Si habla español, tiene a colon disposición servicios gratuitos de  asistencia lingüística. Alexander al [phone].    We comply with applicable federal civil rights laws and [place] laws. We do not discriminate on the basis of race, color, national origin, age, disability, sex, sexual orientation, or gender identity.            Thank you!     Thank you for choosing PSYCHIATRY CLINIC  for your care. Our goal is always to provide you with excellent care. Hearing back from our patients is one way we can continue to improve our services. Please take a few minutes to complete the written survey that you may receive in the mail after your visit with us. Thank you!             Your Updated Medication List - Protect others around you: Learn how to safely use, store and throw away your medicines at www.disposemymeds.org.          This list is accurate as of 9/17/18 11:59 PM.  Always use your most recent med list.                   Brand Name Dispense Instructions for use Diagnosis    acetaminophen 325 MG tablet    TYLENOL    50 tablet    Take 1-2 tablets by mouth every 4 hours as needed for pain.    Balanitis       ibuprofen 600 MG tablet    ADVIL/MOTRIN    50 tablet    Take 1 tablet by mouth every 6 hours as needed for pain.    Balanitis       paliperidone 156 MG/ML Susp injection    INVEGA SUSTENNA    1 mL    Inject 1 mL (156 mg) into the muscle once for 1 dose Every 4 weeks    Schizoaffective disorder, depressive type (H)       risperiDONE 0.5 MG tablet    risperDAL    30 tablet    Take 1 tablet (0.5 mg) by mouth At Bedtime    Schizoaffective disorder, depressive type (H)       traZODone 50 MG tablet    DESYREL    60 tablet    Take one (1) to two (2) tabs at night as needed    Schizoaffective disorder, depressive type (H)
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
## 2018-09-18 LAB
C TRACH DNA SPEC QL NAA+PROBE: NEGATIVE
HCV AB SERPL QL IA: NONREACTIVE
N GONORRHOEA DNA SPEC QL NAA+PROBE: NEGATIVE
SPECIMEN SOURCE: NORMAL
SPECIMEN SOURCE: NORMAL
T PALLIDUM AB SER QL: NONREACTIVE

## 2018-09-19 ENCOUNTER — TELEPHONE (OUTPATIENT)
Dept: PSYCHIATRY | Facility: CLINIC | Age: 18
End: 2018-09-19

## 2018-09-19 DIAGNOSIS — D70.9 NEUTROPENIA, UNSPECIFIED TYPE (H): Primary | ICD-10-CM

## 2018-09-19 NOTE — TELEPHONE ENCOUNTER
----- Message from Alyssa Henry MD sent at 9/19/2018  7:46 AM CDT -----  Regarding: RE: Neutropenia  Thank you both for taking excellent care of this pt who presents some significant challenges.  Huan, thanks so much.    Lona,  I assume you are referring to an ACT team or getting him a CADI waiver and CADI/ ILS/ ARMHS/  team?  If you cannot get assistance with all of that please let me know.  Same for PCP, if you need help let me know.    ----- Message -----     From: Lona Spann, ADAM CNP     Sent: 9/18/2018  10:31 AM       To: Alyssa Henry MD, #  Subject: RE: Neutropenia                                  His adherence has been fairly poor so I would be surprised for multiple reasons if it were secondary to the invega. Thank you for having labs drawn yesterday, Huan.    I am CCing Lona Grady on this as we have started a discussion for what to offer the patient for continuing care.   Lona, would you be able to contact the patient and encourage him to be seen in primary care and communicate our concerns re: neutropenia?     I do have a lot of concerns about this patient's ability to function independently. I would really like to have him connected with more intensive community-based service options.      ----- Message -----     From: Gee Kirkpatrick MD     Sent: 9/18/2018   9:16 AM       To: Alyssa Henry MD, ADAM Purvis CNP, #  Subject: Neutropenia                                      This patient has neutropenia. His ANC today is 1.1. He has historically run low. I don't think the neutropenia is secondary to the Invega, but possibly. He did get a repeat injection yesterday and his last dose before that was 2 months prio.    I am waiting on hepatitis and HIV labs. Once they result, can review and determine course of action. Likely need to just repeat a CBC diff in 1-2 weeks and add-on periph smear.    He does not have a PCP.    Thanks,    Huan

## 2018-09-19 NOTE — TELEPHONE ENCOUNTER
-Writer called and spoke with patient regarding neutropenia.  He states that he feels this has happened before.  Reports that he has seen a primary doctor before, but does not remember who or where.  Writer found a clinic close to his house that he reported that he would be able to get to.  [facility] [phone].  Rohan reported that he would be able to call and make an appointment with them.  Writer let him know that she will call him at the end of the week to check in with him on if he needs any further help with this.  Rohan thanked writer for this.     -Writer and patient also discussed getting more services in the community.  Rohan stated that he felt he could use some help in organizing things and staying on top of his appointments.  Discussed how  might be helpful in this.  Rohan gave writer CM phone number and gave verbal ok for her to contact him in regards to appointments and assistance with more services.

## 2018-09-19 NOTE — TELEPHONE ENCOUNTER
Called pt to discuss test results, specifically neutropenia. Pt agrees to come to lab prior to next injection next week for repeat cbc diff and periph smear. Pending results, would consider referral to [facility].

## 2018-09-19 NOTE — TELEPHONE ENCOUNTER
-Writer called CM, received voicemail.  Left message asking for a call back.  Clinic number provided.

## 2018-09-20 LAB — HIV 1 PRO DNA BLD QL NAA+PROBE: NOT DETECTED

## 2018-09-21 DIAGNOSIS — F25.1 SCHIZOAFFECTIVE DISORDER, DEPRESSIVE TYPE (H): ICD-10-CM

## 2018-09-21 NOTE — TELEPHONE ENCOUNTER
-Writer called patient to check in to see if he made an appointment with a PCP clinic.  Received voicemail, left message asking for a call back.

## 2018-10-01 ENCOUNTER — TELEPHONE (OUTPATIENT)
Dept: PSYCHIATRY | Facility: CLINIC | Age: 18
End: 2018-10-01

## 2018-10-01 NOTE — TELEPHONE ENCOUNTER
10/01/2018 - This writer left detailed VM at [phone], asking pt to call to reschedule his missed injection for Invega Sustenna 156 mg on 09/24/2018.Bee Valdez LPN

## 2018-10-16 ENCOUNTER — TELEPHONE (OUTPATIENT)
Dept: PSYCHIATRY | Facility: CLINIC | Age: 18
End: 2018-10-16

## 2018-10-16 ENCOUNTER — DOCUMENTATION ONLY (OUTPATIENT)
Dept: PSYCHIATRY | Facility: CLINIC | Age: 18
End: 2018-10-16

## 2018-10-16 NOTE — LETTER
October 16, 2018      Rohan Ruggiero  [address] [place] [address]              Dear Rohan,      The clinic has tried reaching you many times due to missed appointments and follow up.  I am writing to inform you that you have an appointment with ADAM Purvis, PMJOSHUA-BC on December 6, 2018.  Your appointment is at 11:30 am at the [facility].  If this appointment no longer works for you please call [phone].      I am also writing to you to encourage you to follow up with a primary care provider, due to recent labs showing neutropenia.  We have spoke on the phone regarding this.  If you need help finding a primary care provider please give us a call at [phone].  Thanks for your attention to this matter.            Sincerely,      Lona Grady, RN with ADAM Purvis, KAYLA-BC

## 2018-10-16 NOTE — TELEPHONE ENCOUNTER
-Writer called patient to follow up to see if appointment had been made with PCP to follow up on neutropenia.  Received voicemail, left message on both numbers in the chart asking for a return call.  Clinic number provided.

## 2018-10-16 NOTE — PROGRESS NOTES
[facility] PSYCHIATRY CLINIC FACULTY NOTE    This patient has a number of issues that need follow-up but he is difficult to engage in treatment, even after multiple conversations about need for care. Most recently, he missed an injection of Invega. Also, he needs follow-up for neutropenia.    PLAN:  1) Have asked our LICSW, Manisha, for help looking into community services which will bring care to him (ACT, CADI, ILS, ARMHS etc).    2) Will ask our nursing staff (Bee) to call pt one more time to come in for injection. Last called on 10/1. If no answer, will ask that letter be sent underscoring importance of injection.    3) Pt currently has psych provider at the [facility] location. Will ask that team to:  - send letter making sure pt knows about appt with Lona in early DEC   - remind pt to see PCP about neutropenia  - Phone calls should be made at same time and need to make sure the pt actually has a PCP. If not, please help him find one.    4) I have asked that Adrienne Colunga review this record and determine appropriateness for [facility]. If appropriate, would transfer care from [facility] clinic to [facility].    MD Stephanie  Medical Director  [facility]

## 2018-10-23 ENCOUNTER — TELEPHONE (OUTPATIENT)
Dept: PSYCHIATRY | Facility: CLINIC | Age: 18
End: 2018-10-23

## 2018-10-23 NOTE — TELEPHONE ENCOUNTER
"10/22/2018, patient \"No Show\" MFU with Dr. Kirkpatrick and injection 156 mg Invega Sustenna. Pt last injection and MFU was on 9/17/2018. A note was routed to Dr. Henry, Dr. Kirkpatrick, and Jaqui Kc RN. This writer left a D/V/M  [phone] (M) and was unable to leave V/M at [phone] (H) on 10/23/18. Bee Valdez LPN    "

## 2018-11-12 ENCOUNTER — TELEPHONE (OUTPATIENT)
Dept: PSYCHIATRY | Facility: CLINIC | Age: 18
End: 2018-11-12

## 2018-11-12 NOTE — TELEPHONE ENCOUNTER
11/12/2018, This writer left detailed voice messages, including this writer's direct line, at the following phone numbers listed in Rohan's chart. 1) [phone] - Jerome Mom Jeanette,  2) [phone] mobile for [employer], and 3) [phone] listed as home phone for Rohan..Bee Valdez LPN

## 2018-11-12 NOTE — TELEPHONE ENCOUNTER
[facility] PSYCHIATRY CLINIC FACULTY NOTE    This patient needs consistent psychiatric follow-up but is difficult to engage. His care will be transferred to [facility] on the [facility], where he gets his injections. His new provider will be ADAM More and will continue with Bee Valdez LPN (who has been tracking his appts closely).     The following are some relevant communications between team members (and potential team members):    ---- Message ----  From: ADAM Purvis CNP Sent: 10/25/2018 10:02 AM    RE: Neutropenia  This patient is a case of someone who was in foster care and aged out of the system with no supports. As I understand he has been residing with bio family which was likely a chaotic environment and the reason why he was placed in foster care in the first place.     I do not believe that he has been involved in [facility] case management and I am unsure of how motivated or interested he is to engage in additional services. He seems to come in every 2-3 months for his injection and I suspect that this may have to do with when he is noticing symptoms are increasing. BUT-- I have only met him twice so I also probably have a very limited view of what has been happening with him.    ----- Message -----  From: Adrienne Colunga LGSW   Sent: 10/25/2018   9:49 AM   RE: Neutropenia                                  Hi-  Q: Does this patient qualify for Strengths.    - Yes, however we would run into the same dilemma of treatment engagement.  And we'd have limited options for return visits since it's only Friday AM.  May have more scheduling flexibility with the general clinic.        It does sound like he needs greater community involvement, like an ACT team.  Lona, does he have a [facility]  in place?   Manisha, thoughts on having Abby explore ACT, etc with him?  Thanks,  -SRAVANI Mcnulty  #[phone]    ----- Message -----  From: Alyssa Henry MD    Sent: 10/15/2018   11:38 PM  RE: Neutropenia                                  Ok, thanks for this.  So, not in [facility]  No longer lives with foster mom.  Will leave you Lona OLIVEIRA as the primary psych provider until we settle on a new plan.  Can you ask you RN partner to call him, again, about seeing PCP about neutropenia and ask her to please send letter as well.  Will place note in chart tonight.  D    ----- Message -----  From: Lona Spann APRN CNP    Sent: 10/15/2018  11:28 PM  RE: Neutropenia                                  We have had difficulty engaging pt in services for SCOTTIE, he has declined ongoing involvement since turning 16. He has also left his foster mom's home after turning 16 and I imagine losing that support has been a contributing factor.  I had agreed to continue to follow him at his request, he has been somewhat more motivated to continue his ALONZO due to benefit with sx management. However, I have had many conversations with him regarding my concern about his getting into the clinic for f/u appt and injection appts.   I think he would be a good candidate for community services if we could help him with this. I have not found that he has had much success meeting with me, due to difficulty getting to [facility] and my limited offering for appts at [facility]. If there is a Strengths provider that could offer more flexibility for appts that could potentially be beneficial.     ----- Message -----  From: Alyssa Henry MD    Sent: 10/15/2018  10:56 PM  RE: Neutropenia                                  Folks,  I see there have been a number of phone contact attempts, including one after he missed last injection appt. It seems this pt needs some community services.   SHANE - do you know this pt?  Can you ask your student to determine if the pt qualifies for ACT, CADI, ILS etc?    Lona,  There is no plan, right now, for this pt to transfer to the resident clinic.  Wouldn't he be good for Strengths if not going to continue  in [facility]?  Is he a [facility] pt?  Stay in that program?  Right now he is scheduled to see you in [place],  I see pt missed injection 9/24 and you called on 10/1.  Please try again, calling him to come in for injection.    Adrienne,  What do you think of this pt for Strengths?    My suggestions:  1) ask Manisha for help with community services, maybe transfer to ACT  2) ask Adrienne if pt can go to [facility]  3) contact foster mom to get help with Follow-up for psych and the neutropenia--send letter to her and pt about both of these needed appts -- a ELTON was signed 3/21/17 [ANGELINA-how long is ELTON good for?]  4) there is no plan, at this time, to transfer to resident clinic; pt has  appt with Lona teddy DEC    ----- Message -----  From: Gee Kirkpatrick MD   Sent: 9/18/2018   9:16 AM  Subject: Neutropenia                                      This patient has neutropenia. His ANC today is 1.1. He has historically run low. I don't think the neutropenia is secondary to the Invega, but possibly. He did get a repeat injection yesterday and his last dose before that was 2 months prio.  I am waiting on hepatitis and HIV labs. Once they result, can review and determine course of action. Likely need to just repeat a CBC diff in 1-2 weeks and add-on periph smear.  He does not have a PCP.  Thanks,  Huan

## 2018-11-15 DIAGNOSIS — F25.1 SCHIZOAFFECTIVE DISORDER, DEPRESSIVE TYPE (H): Primary | ICD-10-CM

## 2018-11-15 DIAGNOSIS — Z79.899 ENCOUNTER FOR LONG-TERM (CURRENT) USE OF MEDICATIONS: ICD-10-CM

## 2018-12-10 ENCOUNTER — TELEPHONE (OUTPATIENT)
Dept: PSYCHIATRY | Facility: CLINIC | Age: 18
End: 2018-12-10

## 2018-12-10 NOTE — LETTER
December 10, 2018      Rohan Ruggiero  [address] [place] [address]              Dear Rohan,      Dear Rohan,      We have not seen you since January 18, 2018 and been unable to reach you for the purposes of scheduling [facility] follow-up appointments. Due to inactivity in the program you have been discharged from [facility]. We gladly invite you to re-establish care with our program. If that is of interest, please contact our clinic at [phone] with request to schedule an appointment.      Please let us know if you are experiencing financial or insurance difficulties that interfere with coming into clinic, we would like to help.       Sincerely,      Your [facility] Team

## 2018-12-10 NOTE — TELEPHONE ENCOUNTER
[facility] Discharge  A Part of the [facility] First Episode of Psychosis Program     Patient Name: Rohan Ruggiero  /Age:  2000 (18 year old)  Diagnosis(es):   Schizoaffective Disorder    Program Discharge Date:   12/10/18  Reason for Discharge:   Patient no longer coming in for appointments.  Many missed appointments.        Patient/Family informed of discharge via Letter    Lona Grady RN

## 2018-12-27 ENCOUNTER — TELEPHONE (OUTPATIENT)
Dept: PSYCHIATRY | Facility: CLINIC | Age: 18
End: 2018-12-27

## 2018-12-28 ENCOUNTER — TELEPHONE (OUTPATIENT)
Dept: PSYCHIATRY | Facility: CLINIC | Age: 18
End: 2018-12-28

## 2018-12-28 NOTE — TELEPHONE ENCOUNTER
- Writer placed a call to Foster Mom Jeanette- [phone]     Writer relayed and reminded patients appt on 1/15/18 at 11 am. Jeanette appreciated the call. No further action needed by this writer.

## 2019-01-16 ENCOUNTER — TELEPHONE (OUTPATIENT)
Dept: PSYCHIATRY | Facility: CLINIC | Age: 19
End: 2019-01-16

## 2019-01-16 NOTE — TELEPHONE ENCOUNTER
1/16/2019, Patient did not show for his appointment on 1/15/2019. This writer spoke with Inocencio Torres mom at [phone], d/t pt's phone being disconnected ([phone]) . Jeanette is off work on Monday 1/21/2019 and will bring him to  to get labs drawn. Jeanette will call this writer back to reschedule a MFU once she touches base with Rohan. A note was routed to Uma Trevino and SKYLER Contreras.Bee Valdez LPN

## 2019-01-21 NOTE — TELEPHONE ENCOUNTER
- Writer placed a call to Jeanette Calvert at [phone] to help reschedule the appt with provider today. Unable to get a hold of the patient. LVM, requesting a call back. Clinic number provided.

## 2019-04-19 ENCOUNTER — OFFICE VISIT (OUTPATIENT)
Dept: PSYCHIATRY | Facility: CLINIC | Age: 19
End: 2019-04-19
Attending: PSYCHIATRY & NEUROLOGY
Payer: MEDICAID

## 2019-04-19 DIAGNOSIS — F25.1 SCHIZOAFFECTIVE DISORDER, DEPRESSIVE TYPE (H): Primary | ICD-10-CM

## 2019-04-19 DIAGNOSIS — F25.1 SCHIZOAFFECTIVE DISORDER, DEPRESSIVE TYPE (H): ICD-10-CM

## 2019-04-19 DIAGNOSIS — Z79.899 ENCOUNTER FOR LONG-TERM (CURRENT) USE OF MEDICATIONS: ICD-10-CM

## 2019-04-19 LAB
ERYTHROCYTE [DISTWIDTH] IN BLOOD BY AUTOMATED COUNT: 13.6 % (ref 10–15)
HCT VFR BLD AUTO: 43.3 % (ref 40–53)
HGB BLD-MCNC: 14.2 G/DL (ref 13.3–17.7)
MCH RBC QN AUTO: 28.6 PG (ref 26.5–33)
MCHC RBC AUTO-ENTMCNC: 32.8 G/DL (ref 31.5–36.5)
MCV RBC AUTO: 87 FL (ref 78–100)
PLATELET # BLD AUTO: 174 10E9/L (ref 150–450)
RBC # BLD AUTO: 4.96 10E12/L (ref 4.4–5.9)
WBC # BLD AUTO: 5.5 10E9/L (ref 4–11)

## 2019-04-19 PROCEDURE — 85027 COMPLETE CBC AUTOMATED: CPT | Performed by: NURSE PRACTITIONER

## 2019-04-19 PROCEDURE — 36415 COLL VENOUS BLD VENIPUNCTURE: CPT | Performed by: NURSE PRACTITIONER

## 2019-04-19 PROCEDURE — G0463 HOSPITAL OUTPT CLINIC VISIT: HCPCS | Mod: ZF

## 2019-04-19 ASSESSMENT — PAIN SCALES - GENERAL: PAINLEVEL: NO PAIN (0)

## 2019-04-19 NOTE — NURSING NOTE
Chief Complaint   Patient presents with     Recheck Medication     Schizoaffective disorder, depressive type (H)
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
## 2019-04-22 ENCOUNTER — TELEPHONE (OUTPATIENT)
Dept: PSYCHIATRY | Facility: CLINIC | Age: 19
End: 2019-04-22

## 2019-04-22 VITALS
WEIGHT: 168.4 LBS | DIASTOLIC BLOOD PRESSURE: 59 MMHG | SYSTOLIC BLOOD PRESSURE: 89 MMHG | HEART RATE: 68 BPM | BODY MASS INDEX: 21.92 KG/M2

## 2019-04-22 NOTE — TELEPHONE ENCOUNTER
4/22/2019, writer received phone call from Jeanette Calvert, matthew mom with concerns about Rohan. PHI on file to talk with Jeanette. Jeanette said Rohan would not be able to make his appointment tomorrow, [place] 4/23/2019 with Uma Trevino, d/t the patient not having any way to come in to the Clinic. Jeanette said she had given Rohan $50.00 to put on his bus card so he could make it to this appointment and when she spoke with Rohan over the weekend he said he had no money left for the bus. Jeanette has concerns for Rohan regarding increased MJ use, not being motivated to go back to school. She states that Rohan no longer has assistance through [facility], which ran out the first of this month (April). Writer explained that according to Dr. Kirkpatrick's notes, he is referring Rohan to the ACT Team and that Adrienne would be the SW who would help with this. Jeanette gave blaker Rohan's  as JUANI Richards, who has helped Rohan in the past. Writer told Jeanette that writer will call her back after touching base with ASIA Deleon in clinic.Bee Valdez LPN

## 2019-05-01 ENCOUNTER — TELEPHONE (OUTPATIENT)
Dept: PSYCHIATRY | Facility: CLINIC | Age: 19
End: 2019-05-01

## 2019-05-01 NOTE — TELEPHONE ENCOUNTER
"First Episode Psychosis   Strengths Program    Incoming/Outgoing Call  [facility] Psychiatry Clinic    Outgoing call to: Antonio \"JUANI\" Richards, [facility]  ([phone])    Reason for Call:  ACT team referral.          Response/Plan:  Shared with RJ that Rohan has had a difficult time coming to the clinic for appointments and maintaining regular med adherence as he does not want to be on medication.  Given his current Dx of Schizoaffective d/o, he should qualify for an ACT team and we should work together in getting a referral in place.   is in support of the recommendation.     JUANI was unable to talk about details at the moment, we arrange to speak tomorrow 5/2/19 to determine next steps.  _________________________________________________________________________  Outgoing call to: Lita Calvert ([phone])    Reason for Call:  ACT team referral.  F/u plan        Response/Plan: Left VM.  Will call back tomorrow afternoon with an update.    _________________________________________________________________________    Outgoing call to: Rohan Ruggiero ([phone])    Reason for Call:  ACT team referral f/u          Response/Plan: Mailbox is full      Will route to patient's current psychiatric provider(s) as an FYI.   Please call or EPIC message with any questions or concerns.    ABRAM Mcnulty, SW  [phone]          "

## 2019-05-02 ENCOUNTER — TELEPHONE (OUTPATIENT)
Dept: PSYCHIATRY | Facility: CLINIC | Age: 19
End: 2019-05-02

## 2019-05-02 NOTE — TELEPHONE ENCOUNTER
"First Episode Psychosis   Strengths Program    Incoming/Outgoing Call  [facility] Psychiatry Clinic    Outgoing call to: Antonio \"JUANI\" [facility]  ([phone])    Reason for Call:  ACT team referral.          Response/Plan:  JUANI and writer spoke in more detail about how Rohan would benefit from an ACT team.  He was unaware of his diagnosis of Schizoaffective d/o, noting Rohan has not been forthcoming about his psychiatric care with him.  JUANI worries that Rohan may not be agreeable to a voluntarily to an ACT team and he is not under commitment.  Though, he will call him and share how it would be of more benefit to him than coming to the clinic.      JUANI will handle the referral from here.  He will get back into contact with me if he heeds additional documentation for the referral.      Will route to patient's current psychiatric provider(s) as an FYI.   Please call or EPIC message with any questions or concerns.    ABRAM Mcnulty, SW  [phone]          "

## 2019-05-07 ENCOUNTER — TELEPHONE (OUTPATIENT)
Dept: PSYCHIATRY | Facility: CLINIC | Age: 19
End: 2019-05-07

## 2019-05-07 NOTE — TELEPHONE ENCOUNTER
"On 4/19/19 the patient signed an ELTON authorizing the release of records from [facility] to Antonio \"JUANI\" [facility] Case Management. This writer sent the document to medical records via the [employer] system on 5/7/19.     "

## 2019-05-15 ENCOUNTER — TELEPHONE (OUTPATIENT)
Dept: PSYCHIATRY | Facility: CLINIC | Age: 19
End: 2019-05-15

## 2019-05-17 ENCOUNTER — OFFICE VISIT (OUTPATIENT)
Dept: PSYCHIATRY | Facility: CLINIC | Age: 19
End: 2019-05-17
Attending: PSYCHIATRY & NEUROLOGY
Payer: MEDICAID

## 2019-05-17 ENCOUNTER — TELEPHONE (OUTPATIENT)
Dept: PSYCHIATRY | Facility: CLINIC | Age: 19
End: 2019-05-17

## 2019-05-17 VITALS
SYSTOLIC BLOOD PRESSURE: 107 MMHG | BODY MASS INDEX: 20.9 KG/M2 | WEIGHT: 160.6 LBS | DIASTOLIC BLOOD PRESSURE: 70 MMHG | HEART RATE: 56 BPM

## 2019-05-17 DIAGNOSIS — F20.0 PARANOID SCHIZOPHRENIA (H): Primary | ICD-10-CM

## 2019-05-17 PROCEDURE — G0463 HOSPITAL OUTPT CLINIC VISIT: HCPCS | Mod: ZF

## 2019-05-17 RX ORDER — QUETIAPINE FUMARATE 100 MG/1
100 TABLET, FILM COATED ORAL AT BEDTIME
Qty: 30 TABLET | Refills: 1 | Status: SHIPPED | OUTPATIENT
Start: 2019-05-17 | End: 2019-07-16

## 2019-05-17 ASSESSMENT — PAIN SCALES - GENERAL: PAINLEVEL: NO PAIN (0)

## 2019-05-17 ASSESSMENT — PATIENT HEALTH QUESTIONNAIRE - PHQ9: SUM OF ALL RESPONSES TO PHQ QUESTIONS 1-9: 4

## 2019-05-17 NOTE — TELEPHONE ENCOUNTER
On 5/17/2019 the patient signed an ELTON authorizing the release of records from [facility] to Manisha Calvert.  I sent the ELTON to medical records - via the tube system - and held a in Psychiatry until the document appears in the media tab of the patient's EMR.Sharon Preston/RUDY
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
## 2019-05-17 NOTE — PATIENT INSTRUCTIONS
- Day treatment will call you. If you don't hear back by early next week, call [phone]  - Can start taking Seroquel at bedtime. Take 100mg 1 hour before going to bed.  - Follow-up with me in 1 week  - Adrienne will be working behind the scenes to help coordinate the ACT team for you.    Thank you for coming to the PSYCHIATRY CLINIC.    Lab Testing:  If you had lab testing today and your results are reassuring or normal they will be mailed to you or sent through [employer] within 7 days.   If the lab tests need quick action we will call you with the results.  The phone number we will call with results is # [phone] (home) . If this is not the best number please call our clinic and change the number.    Medication Refills:  If you need any refills please call your pharmacy and they will contact us. Our fax number for refills is [phone]. Please allow three business for refill processing.   If you need to  your refill at a new pharmacy, please contact the new pharmacy directly. The new pharmacy will help you get your medications transferred.     Scheduling:  If you have any concerns about today's visit or wish to schedule another appointment please call our office during normal business hours [phone] (8-5:00 M-F)    Contact Us:  Please call [phone] during business hours (8-5:00 M-F).  If after clinic hours, or on the weekend, please call  [phone].    Financial Assistance [phone]  [facility] [phone]  [facility] [phone]  Medical Records [phone]      MENTAL HEALTH CRISIS NUMBERS:  [facility]:   [facility] - [phone]   Crisis Residence [facility] - [facility] - [phone]   Walk-In Counseling [facility] - [phone]   COPE 24/7 [place] Mobile Team for Adults - [[phone]]; Child - [[phone]]        [facility]:   [facility] - [phone]   Walk-in counseling [facility] - [phone]    Walk-in counseling [facility] - [phone]   Crisis Residence [facility] - [phone]   Urgent Care Adult Mental Health:   --Drop-in, 24/7 crisis line, [facility] Mobile Team [[phone]]    CRISIS TEXT LINE: Text [phone] from anywhere, anytime, any crisis 24/7;    OR SEE www.crisistextline.org     Poison Control Center - [phone]    CHILD: [facility] needs assessment team - [phone]     [facility] Lifeline - [phone]; or [employer] Lifeline - 1-967.343.3449    If you have a medical emergency please call 911or go to the nearest ER.                    _____________________________________________    Again thank you for choosing PSYCHIATRY CLINIC and please let us know how we can best partner with you to improve you and your family's health.  You may be receiving a survey in the mail regarding this appointment. We would love to have your feedback, both positive and negative, so please fill out the survey and return it using the provided envelope. The survey is done by an external company, so your answers are anonymous.

## 2019-05-22 ENCOUNTER — TELEPHONE (OUTPATIENT)
Dept: PSYCHIATRY | Facility: CLINIC | Age: 19
End: 2019-05-22

## 2019-05-22 NOTE — TELEPHONE ENCOUNTER
"First Episode Psychosis Program    Incoming/Outgoing Call  [facility]    Outgoing call to: Antonio \"JUANI\" Dutch Richards [facility]  ([phone]).    Reason for Call:   ACT team referral follow-up- has not initiated a referral yet.  Wanted to speak with the client directly first and to get a release.      Medications- Confirmed recommendation to restart seroquel.  Indicated it would be a good idea to ask Rohan which meds he is currently taking daily or not.      Day Treatment referral- Confirmed day tx referral has been submitted for [facility], and they will call Rohan to schedule.     SMRT/SSI- Indicated that if Rohan does not agree to ACT services, SMRT would be necessary to get him involved with a CADI waiver.  Rohan requires more community support beyond outpatient services.  Also shared that applying to SOCIAL SECURITY INCOME (SSI) would be a good idea too, knowing that process can take a long time.  Requested JUANI's assistance to help Rohan through this.     Response/Plan:  Juani will f/u with this writer following his meeting with an update on his care.       Will route to patient's current psychiatric provider(s) as an FYI.   Please call or EPIC message with any questions or concerns.    ABRAM Mcnulty, SW  [phone]        "
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
## 2019-06-03 ENCOUNTER — OFFICE VISIT (OUTPATIENT)
Dept: PSYCHIATRY | Facility: CLINIC | Age: 19
End: 2019-06-03
Attending: NURSE PRACTITIONER
Payer: MEDICAID

## 2019-06-03 VITALS
DIASTOLIC BLOOD PRESSURE: 67 MMHG | WEIGHT: 159.4 LBS | HEART RATE: 62 BPM | SYSTOLIC BLOOD PRESSURE: 112 MMHG | BODY MASS INDEX: 20.75 KG/M2

## 2019-06-03 DIAGNOSIS — F25.1 SCHIZOAFFECTIVE DISORDER, DEPRESSIVE TYPE (H): Primary | ICD-10-CM

## 2019-06-03 PROCEDURE — G0463 HOSPITAL OUTPT CLINIC VISIT: HCPCS | Mod: ZF

## 2019-06-03 ASSESSMENT — PAIN SCALES - GENERAL: PAINLEVEL: MODERATE PAIN (5)
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
## 2019-06-04 ASSESSMENT — PATIENT HEALTH QUESTIONNAIRE - PHQ9: SUM OF ALL RESPONSES TO PHQ QUESTIONS 1-9: 15

## 2019-06-20 ENCOUNTER — TELEPHONE (OUTPATIENT)
Dept: PSYCHIATRY | Facility: CLINIC | Age: 19
End: 2019-06-20

## 2019-06-20 NOTE — TELEPHONE ENCOUNTER
On 06/20/19 the patient signed an ELTON authorizing medical records to be released from [facility] to [facility] and [facility]. for the purpose of continuing of care.  I sent the original  ELTON to Medical Records via the tube system and kept a copy in psychiatry until scanning is complete/confirmed. Jeri Yao/RUDY

## 2019-06-20 NOTE — TELEPHONE ENCOUNTER
"Returned foster Mom Jeanette's call re: Rohan's desire to get on an ACT team. She notes increasing agitation and that he called her from his family's home in  for help. She feels certain she and he are safe and knows how to seek help as needed.    Left VM for Vicki SALDIVAR, Inocencio ALTAMIRANO \"RJ\" and [facility]. Called [facility] for referral form but no response. Seeking ROIs for [facility], [employer] [employer] and [facility] to complete documentation needed for referral. Bee will share update with foster Mom, ELTON on file.  "

## 2019-06-21 ENCOUNTER — TELEPHONE (OUTPATIENT)
Dept: PSYCHIATRY | Facility: CLINIC | Age: 19
End: 2019-06-21

## 2019-06-21 NOTE — TELEPHONE ENCOUNTER
On 6/20/2019 the patient signed 3 ELTON's authorizing the release of records from [facility] to 1. [employer], 2. [employer], and [employer] [facility] for the purpose of completing paperwork/forms for services. NO records are to be sent at this time. The original copies were sent to scanning and copies are held in Psych.Bee Valdez LPN

## 2019-06-24 ENCOUNTER — TELEPHONE (OUTPATIENT)
Dept: PSYCHIATRY | Facility: CLINIC | Age: 19
End: 2019-06-24

## 2019-06-24 NOTE — TELEPHONE ENCOUNTER
Completed forms were returned to this writer from Uma Trevino. The forms were faxed and dated 6/28/2019 by Uma Trevino. The original copies were sent to scanning and copies are held in Psych until scanning is complete.Bee Valdez LPN    Forms for possible ACT Team - FORMS x3 - [facility], [facility], [facility]. The original copies were given to Jeanette Pandya for completion. A note was routed to Jeanette and [facility]ino.Bee Valdez LPN

## 2019-06-26 ENCOUNTER — MEDICAL CORRESPONDENCE (OUTPATIENT)
Dept: HEALTH INFORMATION MANAGEMENT | Facility: CLINIC | Age: 19
End: 2019-06-26

## 2019-06-26 ENCOUNTER — TELEPHONE (OUTPATIENT)
Dept: PSYCHIATRY | Facility: CLINIC | Age: 19
End: 2019-06-26

## 2019-06-28 ENCOUNTER — TRANSFERRED RECORDS (OUTPATIENT)
Dept: HEALTH INFORMATION MANAGEMENT | Facility: CLINIC | Age: 19
End: 2019-06-28

## 2019-06-28 ENCOUNTER — MEDICAL CORRESPONDENCE (OUTPATIENT)
Dept: HEALTH INFORMATION MANAGEMENT | Facility: CLINIC | Age: 19
End: 2019-06-28

## 2019-06-28 NOTE — TELEPHONE ENCOUNTER
Social Work  Outgoing Voicemail Message  [facility]      Left a detailed voicemail message for JUANI Moreno. SW following up on patient request for ACT services and to coordinate care. SW checking on what type of case management services JUANI provides.      Writer requested call back. Included writer's direct contact information and availability.     Plan: SW will follow up with  next week.        Tamiko Pandya, ABRAM, LICSW        Update: RJ returned writer's call and left voicemail. He is a . Patient does not currently have any adult service providers. He would also like to coordinate services.    Tamiko Pandya, ABRAM, LICSW

## 2019-06-28 NOTE — TELEPHONE ENCOUNTER
Social Work   Incoming/Outgoing Call  [facility]    Outgoing Call To: JUANI Richards, patient's     Reason for Call:  SW reviewing patient paperwork to complete ACT referral. While looking through patient chart, writer noticed that JUANI had communications with another  in clinic. In these communications, it appeared that JUANI had agreed to submit ACT referral approximately 1 month ago. SW following up to try to identify who was going to send in paperwork and identify why this had not been completed as discussed 1 month ago.    Response/Plan:  JUANI stated that he did have communication with another clinic . He states that he had called clinic but never received call back, then received a call while patient was in clinic appointment stating that clinic would take care of referral. SW reviewed importance of following through with task in order to get patient necessary services. Due to concerns about ensuring referrals occur, writer and clinic will continue referral process for ACT.      Will route to patient's current psychiatric provider(s) as an FYI.   Please call or EPIC message with any questions or concerns.    Tamiko Pandya, ABRAM, [facility]  [phone]

## 2019-06-28 NOTE — TELEPHONE ENCOUNTER
"Social Work   Incoming/Outgoing Call  [facility]    Outgoing Call To: Rohan Ruggiero and Manisha Enrike (patient's foster care mother)    Reason for Call:  SW completing forms for ACT referral and need additional information.     Response/Plan:  ASIA left voicemail for Rohan. ASIA also followed up with Manisha in case Rohan had questions about phone call. Writer requesting to complete brief CAGE assessment for diagnostic.      Update: Rohan returned writer's call following day. Writer completed CAGE assessment:  C   no  A   yes  G   yes  E  Maybe, \"if there is something left over.\"    Patient reports limited alcohol use, mostly marijuana.     Rohan continues to be interested in ACT teams. He also wants to apply for social security. SW provided brief overview of options, including going to Social Security office, applying on line, or going through a . SW briefly reviewed pros and cons of each approach and offered to help get Rohan to resources to assist him when he is ready.    Will route to patient's current psychiatric provider(s) as an FYI.   Please call or EPIC message with any questions or concerns.    Tamiko Pandya, ABRAM, [facility]  [phone]      "
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
## 2019-06-28 NOTE — TELEPHONE ENCOUNTER
Social Work   Incoming/Outgoing Call  [facility]    Incoming From:  Manisha Calvert, patient's foster mom    Reason for Call:  Manisha following up on writer's call to Rohan on 6/27/19.    Response/Plan:  Manisha wanted to clarify that Rohan wants an ACT team. ASIA reviewed that [facility] typically contract with local agencies to provide ACT services. ASIA re-affirmed that clinic was referring Rohan to an ACT team one additional time in phone call. ASIA also reviewed information she provided to Rohan regarding applying for social security.      Will route to patient's current psychiatric provider(s) as an FYI.   Please call or EPIC message with any questions or concerns.    Tamiko Pandya, ABRAM, [facility] Center[facility]  [phone]

## 2019-07-16 ENCOUNTER — TELEPHONE (OUTPATIENT)
Dept: PSYCHIATRY | Facility: CLINIC | Age: 19
End: 2019-07-16

## 2019-07-16 DIAGNOSIS — F20.0 PARANOID SCHIZOPHRENIA (H): ICD-10-CM

## 2019-07-16 NOTE — TELEPHONE ENCOUNTER
Social Work   Incoming Voicemail  [facility]    Incoming Voicemail From:  Lita Calvert matthew mom    Content of Voicemail:    Lita misplaced numbers for [facility]. She is requesting numbers.    Response/Plan:    Writer provided update that [facility] reports not receiving any paperwork. Lita reports finding the number for [facility] on-line and hearing same information. She expressed frustration as paperwork has been faxed twice. ASIA provided education on ACT team, noting that [place] has multiple ACT teams that they contract with, as well as one run directly by the [facility].    Writer reviewed that she does not have a release of information signed for [place] ACT team and thus cannot send paperwork to them. Lita requested releases be mailed to her and she will assist Rohan in completing and returning.    ASIA will mail release forms for [facility] ACT team and [facility] ACT team.    Will route to patient's current psychiatric provider(s) as an FYI.   Please call or EPIC message with any questions or concerns.    Tamiko Pandya, MSW, LICSW
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
## 2019-07-18 ENCOUNTER — MEDICAL CORRESPONDENCE (OUTPATIENT)
Dept: HEALTH INFORMATION MANAGEMENT | Facility: CLINIC | Age: 19
End: 2019-07-18

## 2019-07-18 RX ORDER — QUETIAPINE FUMARATE 100 MG/1
100 TABLET, FILM COATED ORAL AT BEDTIME
Qty: 30 TABLET | Refills: 0 | Status: SHIPPED | OUTPATIENT
Start: 2019-07-18 | End: 2019-08-19

## 2019-07-18 NOTE — TELEPHONE ENCOUNTER
Medication requested: QUEtiapine (SEROQUEL) 100 MG tablet  Last refilled: 6/16/19  Qty: 30      Last seen: 6/3/19  RTC: prn  Cancel: 0  No-show: 1  Next appt: not scheduled    Refill decision:   Refill pended and routed to the provider for review/determination due to   No show x 1

## 2019-07-23 ENCOUNTER — TELEPHONE (OUTPATIENT)
Dept: PSYCHIATRY | Facility: CLINIC | Age: 19
End: 2019-07-23

## 2019-07-23 NOTE — TELEPHONE ENCOUNTER
Social Work   Incoming/Outgoing Call  [facility]    Incoming From:  Lita Calvert    Reason for Call:  Lita calling with update    Response/Plan:  Rohan's foster mother heard from [employer]. They scheduled an appointment with Lita and Rohan on August 6. She is wondering about getting an appointment for Rohan with Uma Trevino. SW reviewed that if patient is accepted for ACT, they will also be seen by ACT psychiatrist/nurse practitioner rather than at Psychiatry Clinic. SW checked Uma Trevino's schedule; no openings until at least mid-August. Lita feels Rohan will be okay until ACT appointment. She is wondering if he has any labs he needs to do. Writer will check with provider to see if patient needs any additional labs/visits.      Will route to patient's current psychiatric provider(s) as an FYI.   Please call or EPIC message with any questions or concerns.    ABRAM Dorado, [facility]  [phone]

## 2019-07-23 NOTE — TELEPHONE ENCOUNTER
Writer received ELTON from patient to release records from [facility] to [facility] ACT team and [facility] ACT team. The ELTON are not signed by the patient.     Writer placed a call to patient at [phone] to inform that ELTON is not signed and will need to be signed. No answer at number provided. LVM, requesting a call back. Clinic number provided.

## 2019-07-30 ENCOUNTER — TELEPHONE (OUTPATIENT)
Dept: PSYCHIATRY | Facility: CLINIC | Age: 19
End: 2019-07-30
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
## 2019-07-30 NOTE — TELEPHONE ENCOUNTER
Writer placed a call to [place] [facility] line at [phone] and relayed patients background information with his current symptoms. Writer agreed to call the patient tomorrow for an update.

## 2019-07-30 NOTE — TELEPHONE ENCOUNTER
Social Work   Incoming/Outgoing Call  [facility]    Outgoing Call To: Lita Calvert and Rohan Ruggiero    Reason for Call:  ASIA following up on symptoms and patient safety.    Response/Plan:  Lita went over crisis residence options with Rohan. He seems more interested. She is wondering if [place] also has one. Writer reviewed that Rosalba Bernardo is in [facility]. Writer will email information to Lita.     SW reviewed [facility] crisis number as another resource. Lita does not think she needs them out to evaluate at this time, but was agreeable to clinic calling and providing brief information in case she or Rohan calls. [place] provided number to [facility]: [phone].     Lita is going to delay her trip until early tomorrow morning. She thought it would be helpful for clinic to call Rohan tomorrow to check in as well.      Will route to patient's current psychiatric provider(s) as an FYI.   Please call or EPIC message with any questions or concerns.    Tamiko Pandya, ABRAM, [facility]  [phone]

## 2019-07-31 ENCOUNTER — TELEPHONE (OUTPATIENT)
Dept: PSYCHIATRY | Facility: CLINIC | Age: 19
End: 2019-07-31

## 2019-07-31 NOTE — TELEPHONE ENCOUNTER
Writer placed a call to patient at [phone]. Patient reports doing well today. Shared staying with his grandparents last night. Denies thoughts to harms self or others. Denies VH or AH at this time. Patient was with a friend and refused to share more information with this writer. He contracted for safety and agreed to call the clinic or go into the ED if symptoms worsen.     Routed to provider and ASIA Reid as QUAN

## 2019-07-31 NOTE — TELEPHONE ENCOUNTER
Writer placed a call to patient at [phone] for an update from yesterday's call with foster mother. No answer at number provided. LVM, requesting a call back. Clinic number provided.

## 2019-07-31 NOTE — TELEPHONE ENCOUNTER
Writer placed a call to patient at [phone] for an update. No answer at number provided. Phone line coming busy. Unable to LVM.

## 2019-08-07 NOTE — TELEPHONE ENCOUNTER
Outreach     Uma Trevino, ADAM CNP  You; Tamiko Pandya LICSW 15 minutes ago (12:06 PM)      Thanks you guys. Just left him a VM and invited a call back.     Uma clark

## 2019-08-14 ENCOUNTER — TELEPHONE (OUTPATIENT)
Dept: PSYCHIATRY | Facility: CLINIC | Age: 19
End: 2019-08-14

## 2019-08-14 NOTE — TELEPHONE ENCOUNTER
"Writer re-faxed 16 pages, including cover sheet, to [facility] \"[facility]\" - [facility] Referral to [phone]. Writer called [facility] Team at [phone] to verify fax number and was told to add a note on the Cover Sheet to have someone from the Purple Team call writer upon receipt of fax. Writer agreed.Bee Valdez LPN    "

## 2019-08-14 NOTE — TELEPHONE ENCOUNTER
DA for ACT team   Received: Today   Message Contents   Ila Gipson, Tamiko Jaime, [facility]; Diane Willis, SKYLER   Cc: Bee Valdez LPN; Uma Trevino APRN CNP   Phone Number: [phone]             Hi,     This patient's mother called because the ACT team is needing a DA, recent notes concerning his condition, and a list of his medications. She said the team needs this ASAP so they can start services. Is this something we can get to her/the team soon?     Thanks,   Ila Valdez LPN agreed to re-fax the DA and the latest office visit note from 6/3/19    Writer placed a call to [facility] at [phone] to get a correct fax number. DA and last office visit notes faxed to [phone] by Bee Valdez LPN.     No further action needed by this writer

## 2019-08-15 DIAGNOSIS — F20.0 PARANOID SCHIZOPHRENIA (H): ICD-10-CM

## 2019-08-19 RX ORDER — QUETIAPINE FUMARATE 100 MG/1
100 TABLET, FILM COATED ORAL AT BEDTIME
Qty: 30 TABLET | Refills: 0 | Status: SHIPPED | OUTPATIENT
Start: 2019-08-19 | End: 2021-05-25

## 2019-08-19 NOTE — TELEPHONE ENCOUNTER
Medication requested: QUEtiapine (SEROQUEL) 100 MG tablet  Last refilled: 7-18-19  Qty: 30      Last seen: 6-3-19  RTC: As Needed  Cancel: 0  No-show: 1  Next appt: NONE    Refill decision:   Refill pended and routed to the provider for review/determination due to NOS x1.  Scheduling not notified to call patient as RTC  Was to be PRN.      Kathleen M Doege RN

## 2019-11-05 ENCOUNTER — HEALTH MAINTENANCE LETTER (OUTPATIENT)
Age: 19
End: 2019-11-05

## 2020-04-18 NOTE — PROGRESS NOTES
"  PSYCHIATRY CLINIC   FIRST EPISODE PROGRAM PROGRESS NOTE    PSYCH CRITICAL ITEM HISTORY includes inpatient hospitalization (command hallucinations, AH, paranoia), dual diagnosis day treatment and residential treatment for cannabis abuse.  Foster Mom Lita presents for initial part of assessment. She reports he received money through [employer] but needs to attend school consistently. He takes trazodone for sleep interrupted by NMs and AH but wakes late; his school is willing to have him start later in the day. Foster Mom asks if writer will support client with a letter (if needed) for [employer] and his school, writer agrees.     Rohan Ruggiero is a 16 year old male who was last seen in clinic on 4/18/17 at which time Risperidal Consta 25mg was initiated and oral risperidone 4mg nightly with trazodone 100mg nightly PRN was continued. The patient reports good treatment adherence.  History was provided by patient who was a good historian.  Since the last visit:  He reports his mood has been \"pretty good\". He denies significant depression, anxiety and irritability. He attends school everyday but is bored at home because he lives apart from family and friends in [place]. He notes that feeling bored and isolated in [place] increases some depression symptoms. Lita and client both report feeling safe at home.    He denies current lethality; he had passive SI once yesterday; he denies plan or intention.    His AH are less frequent and less intense; he hears people \"in the background\" warning him about things, plans, where he needs or can go, what will happen to him. He denies AH that are command in nature. He denies VH. He feels bugs crawling on his skin sometimes. He denies paranoia and instead describes AH that offer running commentary.    RECENT SYMPTOMS:   PSYCHOSIS:  audible hallucinations, running commentary; denies paranoia;  DENIES- percpetual disturbance [none], delusions, paranoia, ideas of reference, thought " "withdrawal / insertion / deletion / broadcasting, disorganized speech and disorganized behavior  EATING DISORDER: none    RECENT SUBSTANCE USE:     ALCOHOL-  quit none \"for a long time, months\"       TOBACCO- \"sometimes I smoke 1-2 day\"        CAFFEINE- infrequently drinks sodas/day        OPIOIDS- none   NARCAN KIT- N/A    CANNABIS- none             OTHER ILLICIT DRUGS- none     ADVERSE EFFECTS:  Reports sedation.  Denies GI [nausea, diarrhea, constipation, vomiting], weight changes [increase, decrease, weight is stable], headache, sexual dysfunction [none], tremor, confusion, dizziness, throat tightness, cough, arm/ neck pain, chest symptoms [pain, tightness, SOB, palps, heartburn, GERD] and falls.    SOCIAL HISTORY                                    Social Engagement- feels isolated in [place] when his friends live in [place]  Living Situation- lives with foster Mom Lita    Children- none  Financial Support- family or friend and unknown [facility] assistance through foster care.  Educational Functioning- senior at [employer], anticipates graduating in early June  Feels Safe at Home- Yes.  Sleep: interrupted by NMs and AH (\"sometimes I hear and sometimes it calms down\").  Appetite: low; weight stable at 177 since 4/18/17; lost 4-5# in early in April; 18# weight gain since 2/2/17  FIRST EPISODE HISTORY       DUP: foster Mom previously reported hallucinations for \"a number of years\"; unclear timeline; per 2/8/17, possibly endorsing AH as long as 4 years ago.  Route to initial care: hospitalized at [place] 1/27 - 2/3/17 then referred to dual diagnosis who made referral to FE program  First episode workup: not completed  MATRICS Consensus Cognitive Battery: not completed  Medication adherence: good, transitioning to Consta with history of non compliance  General frequency of visits: q2 weeks  Participation in groups: establishing in [facility] services  Cognitive Remediation: not completed     Previously " trialed Abilify, Seroquel, Latuda and Adderall.     RECENT MED HISTORY:   3/28/17: continue risperidone 4mg qHS, benztropine 0.5mg qHS PRN, Seroquel 100mg nightly  4/18/17: continue risperidone 4mg qHS, start Consta 25mg [id number], retrial trazodone 50mg (1-2) qHS PRN; client elected to stop Seroquel and has not needed benztropine.  5/10/17: continue risperidone 4mg qHS, continue with Consta 25mg (has received 2 inj), trazodone 50mg (1-2) qHS PRN    MEDICAL / SURGICAL HISTORY                                   CARE TEAM:          PCP- none established                    Therapist- spoke with Wil via phone    Patient Active Problem List   Diagnosis     Balanitis     Penile pain     Paranoid delusion (H)     Psychosis       ALLERGY                                No known drug allergies  MEDICATIONS                               Current Outpatient Prescriptions   Medication Sig Dispense Refill     risperiDONE (RISPERDAL) 4 MG tablet Take 1 tablet (4 mg) by mouth At Bedtime 30 tablet 0     traZODone (DESYREL) 50 MG tablet Take one (1) to two (2) tabs at night as needed 60 tablet 0     risperiDONE microspheres (RISPERDAL CONSTA) 25 MG injection Inject 2 mLs (25 mg) into the muscle every 14 days (Patient not taking: Reported on 4/18/2017) 1 each 1     benztropine (COGENTIN) 0.5 MG tablet Take 1 tablet (0.5 mg) by mouth At Bedtime Take with risperidone to prevent muscle stiffness (Patient not taking: Reported on 4/18/2017) 30 tablet 0     acetaminophen (TYLENOL) 325 MG tablet Take 1-2 tablets by mouth every 4 hours as needed for pain. 50 tablet 0     ibuprofen (ADVIL,MOTRIN) 600 MG tablet Take 1 tablet by mouth every 6 hours as needed for pain. 50 tablet 1        VITALS   /79  Pulse 80  Wt 80.3 kg (177 lb)  BMI 23.04 kg/m2      Weight prior to medication: 150s    MENTAL STATUS EXAM                                                             Alertness: alert  and oriented  Appearance: well  groomed  Behavior/Demeanor: cooperative, pleasant and calm, with fair  eye contact   Speech: normal  Language: intact  Psychomotor: normal or unremarkable  Mood: improved, no significant mood symptoms today; mood lability reported last week  Affect: flat; was not congruent to mood; was congruent to content  Thought Process/Associations: unremarkable  Thought Content:  Reports suicidal ideation , violent ideation and psychotic thought;  Denies suicidal ideation  and violent ideation  Perception:  Reports auditory hallucinations (running commentary;  command-NO;  following command-NO and tactile hallucinations (feels bugs crawling on his skin);  Denies visual hallucinations (any)  Insight: limited  Judgment: adequate for safety  Cognition: does  appear grossly intact; formal cognitive testing was not done    LABS and DATA     ANTIPSYCHOTIC LABS ROUTINE      Recent Labs   Lab Test  02/28/17   1028   GLC  91     Recent Labs   Lab Test  02/28/17   1028   CHOL  156   TRIG  101*   LDL  83   HDL  53     No lab results found.  No lab results found.    RATING SCALES:  PHQ- 18, not difficult on daily functioning    PHQ9 TODAY   PHQ-9 SCORE 3/28/2017 3/30/2017 4/18/2017   Total Score 15 0 11     DIAGNOSIS   unspecified psychosis; r/o schizophrenia, cannabis abuse in recent remission; RAD, ADHD, OCD, PTSD, anxiety and depression per history     ASSESSMENT                                     Background: Significant psychiatric history of RAD and OCD, anxiety, depression, psychosis, cannabis abuse. He was was hospitalized at [facility] for stabilization of psychosis (command hallucinations and paranoia in context of ongoing cannabis use 1/27 - 2/3/17).  Patient participated in dual diagnosis between 2/8 - 3/2/17. Relevant psychosocial stressors include academics (attendance, poor academic performance, level IV setting), family dynamics (past CPS involvement with bio family, living with foster mom intermittently since age 4),  chronic mental health issues (psychosis, hospitalization, residential treatment at [facility] in [place], limited insight), medication non-adherence and recent sobriety. Genetic loading for depression and bipolar disorder in his mom and brother. He never knew his Dad and was removed from his bio mom's care at 3yo.        TODAY: He and Lita choose to continue current med regimen and RTC in one week to reassess symptoms before next Consta inj.    MANAGEMENT:  Monitoring for adverse effects, routine vitals, routine labs, periodic EKGs and using lowest therapeutic dose of [neuroleptics]     PLAN                                                                                                       1) PSYCHOTROPIC MEDICATIONS:   - continue risperidone 4mg qHS with Consta 25mg y5cseod and trazodone 50mg (1-2) qHS PRN. Has not taken benztropine and states no need. No evidence or report of EPSE/ TD; reports daytime sedation.    2) THERAPY:  Start    3) LABS NEXT DUE:  Labs drawn last 2/28/17; negative utox 3/1/17       RATING SCALES:  next visit obtain AIMS    4) MTM REFERRAL [PharmD]:  none    5) :  none    6) FAMILY MEETING:  yes, spoke with foster MomLita    7) RTC: one week, sooner as needed    TREATMENT RISK STATEMENT:  The risks, benefits, alternatives and potential adverse effects have been discussed and are understood by the patient/ patient's guardian. The pt understands the risks of using street drugs or alcohol.  There are no medical contraindications, the pt agrees to treatment with the ability to do so.  The patient understands to call 911 or come to the nearest ED if life threatening or urgent symptoms present.  CRISIS NUMBERS:   Provided routinely in AVS.    PROVIDER: ADAM Cameron CNP       no

## 2020-04-24 NOTE — PROGRESS NOTES
"NAVIGATE Clinician Contact & Progress Note   For Family Education Program    NAVIGATE Enrollee: Rohan Ruggiero (2000)     MRN: [id number]  Date:  7/06/17  Diagnosis(es): Schizoaffective disorder  Clinician: NAVIGATE Director & Family Clinician, ABRAM Zaldivar, SRAVANI    1. Type of contact: (majority of time spent)  Family Session    2. People present:   Family Clinician/Writer  Significant Other/Family/Friend: Adult Foster Mother      3. Total number of persons who participated in contact:1    4. Length of Actual Contact: 60 minutes     5. Location of contact:  Psychiatry Clinic, [facility]    6. Did the family complete the home practice option(s) from the previous session: Yes    7. Motivational Teaching Strategies:  Connect info and skills with personal goals  Promote hope and positive expectations  Explore pros and cons of change  Re-frame experiences in positive light    8. Educational Teaching Strategies:  Review of written material/education  Relate information to client's experience  Ask questions to check comprehension  Break down information into small chunks  Adopt client's language    9. CBT Teaching Strategies:  Reinforcement and shaping (positive feedback for steps towards goals, gains in knowledge & skills, follow-through on home assignments)    Coping skills training (review current coping skills, increase currently used skills, model new skill, role play new skill, feedback, plan home practice)    10. Psychoeducational Topic(s) Addressed  Just the Facts - Coping with Stress    11. Techniques utilized:   [place] announced at beginning of session  Review of homework  Review of goal  Review of previous meeting  Present new material  Problem-solving practice  Help family choose a home practice option  Summarize progress made in current session    12. Assessment/Progress Note:     Completed \"Coping with stress\" module and reviewed end of medication module.  Completed life events checklist, daily " TF RESIDUAL 50 hassle checklist, and signs of stress checklist.     13. Plan/Referrals:     Will meet with family weekly as schedule allows for evidence based family psychoeducation and therapeutic support aimed at maximizing Rohan Ruggiero's opportunity for recovery from psychosis.     Assigned home practice option #2. Review signs of stress checklist with your family member in Navigate.    ABRAM Zaldivar, SW  NAVIGATE Director & Family Clinician     Attestation:    I did not see this patient directly. This patient is discussed with me in individual clinical social work supervision, and I agree with the plan as documented.     ABRAM Whatley, [facility] Hospital[facility], August 10, 2017

## 2020-11-22 ENCOUNTER — HEALTH MAINTENANCE LETTER (OUTPATIENT)
Age: 20
End: 2020-11-22

## 2020-12-01 NOTE — LETTER
May 9, 2017      TO:    [facility]            Edwina James            [phone]    RE: Rohan Ruggiero  : 2000         Edwina,    This is the information from Rohan's last office visit with Uma MEDINA CNP on 17 that you have requested.  Rohan is currently scheduled for a follow up appointment on 5/10/17 so this information may be updated following this.  Please let me know if you have any additional questions.    Diagnosis  Axis 1: unspecified psychosis, cannabis abuse in recent remission; RAD, ADHD, OCD, PTSD, anxiety and depression per history  Axis 2: none     Plan:  He chooses to continue oral risperidone 4mg nightly, start Consta 25mg a9Phflk today and retrial trazodone 50mg (1-2) qHS PRN to target frequent wakenings. He has not needed benztropine and has not taken quetiapine.    Sincerely,       Zeny Nova RN with Uma Trevino   rapid afib

## 2021-01-27 NOTE — Clinical Note
In the course of treatment, there was an indication to complete a trauma assessment.  This was the nature of this appointment.  Not sure how to code, but it is a 90 minute evaluation w/consultation.  This assessment was completed to determine the presence of PTSD. Prep Survey      Responses   [facility] patient discharged from?  [facility]   Is LACE score < 7 ?  No   Emergency Room discharge w/ pulse ox?  No   Eligibility  [facility]   Date of Admission  01/25/21   Date of Discharge  01/26/21   Discharge Disposition  Home or Self Care   Discharge diagnosis  Atrial fibrillation with RVR    Does the patient have one of the following disease processes/diagnoses(primary or secondary)?  Other   Does the patient have Home health ordered?  No   Is there a DME ordered?  No   Prep survey completed?  Yes          Ariadna Santizo RN

## 2021-05-25 ENCOUNTER — TELEPHONE (OUTPATIENT)
Dept: UROLOGY | Facility: CLINIC | Age: 21
End: 2021-05-25

## 2021-05-25 ENCOUNTER — OFFICE VISIT (OUTPATIENT)
Dept: INTERNAL MEDICINE | Facility: CLINIC | Age: 21
End: 2021-05-25
Payer: COMMERCIAL

## 2021-05-25 VITALS
BODY MASS INDEX: 24.81 KG/M2 | OXYGEN SATURATION: 99 % | DIASTOLIC BLOOD PRESSURE: 78 MMHG | SYSTOLIC BLOOD PRESSURE: 108 MMHG | WEIGHT: 190.6 LBS | HEART RATE: 86 BPM

## 2021-05-25 DIAGNOSIS — N53.19 ABNORMAL EJACULATION: ICD-10-CM

## 2021-05-25 DIAGNOSIS — Z00.00 HEALTHCARE MAINTENANCE: ICD-10-CM

## 2021-05-25 DIAGNOSIS — Z76.89 ENCOUNTER TO ESTABLISH CARE: Primary | ICD-10-CM

## 2021-05-25 DIAGNOSIS — F20.9 SCHIZOPHRENIA, UNSPECIFIED TYPE (H): ICD-10-CM

## 2021-05-25 PROCEDURE — 99204 OFFICE O/P NEW MOD 45 MIN: CPT | Mod: GC | Performed by: STUDENT IN AN ORGANIZED HEALTH CARE EDUCATION/TRAINING PROGRAM

## 2021-05-25 RX ORDER — PALIPERIDONE PALMITATE 156 MG/ML
INJECTION INTRAMUSCULAR
COMMUNITY
Start: 2021-03-29

## 2021-05-25 RX ORDER — PALIPERIDONE PALMITATE 234 MG/1.5ML
INJECTION INTRAMUSCULAR
COMMUNITY
Start: 2021-05-18

## 2021-05-25 ASSESSMENT — ANXIETY QUESTIONNAIRES
3. WORRYING TOO MUCH ABOUT DIFFERENT THINGS: MORE THAN HALF THE DAYS
GAD7 TOTAL SCORE: 7
6. BECOMING EASILY ANNOYED OR IRRITABLE: SEVERAL DAYS
IF YOU CHECKED OFF ANY PROBLEMS ON THIS QUESTIONNAIRE, HOW DIFFICULT HAVE THESE PROBLEMS MADE IT FOR YOU TO DO YOUR WORK, TAKE CARE OF THINGS AT HOME, OR GET ALONG WITH OTHER PEOPLE: SOMEWHAT DIFFICULT
7. FEELING AFRAID AS IF SOMETHING AWFUL MIGHT HAPPEN: SEVERAL DAYS
1. FEELING NERVOUS, ANXIOUS, OR ON EDGE: SEVERAL DAYS
2. NOT BEING ABLE TO STOP OR CONTROL WORRYING: SEVERAL DAYS
5. BEING SO RESTLESS THAT IT IS HARD TO SIT STILL: SEVERAL DAYS

## 2021-05-25 ASSESSMENT — PATIENT HEALTH QUESTIONNAIRE - PHQ9
5. POOR APPETITE OR OVEREATING: NOT AT ALL
SUM OF ALL RESPONSES TO PHQ QUESTIONS 1-9: 9

## 2021-05-25 NOTE — PATIENT INSTRUCTIONS
Please see your psychiatrist and discuss the side effects of paliperidone (invega) with them.    Please also see a urologist (referral placed) for evaluation of abnormal ejaculation.

## 2021-05-25 NOTE — PROGRESS NOTES
PRIMARY CARE CENTER         HPI:       Rohan Ruggiero is a 20-year-old man with schizophrenia, schizoaffective disorder (depressive type), PTSD, who presents to clinic to establish care and with a major complaint of abnormal ejaculation.    Pt says he has noticed over the past few weeks to months that there is decreased seminal production as well as blunted climax during sexual activities. He is still interested in sex, but says his erections are less tumescent. He is concerned that this is an adverse effect of the monthly paliperidone injections he receives for treatment of schizophrenia.    Pt is also concerned about weight gain. He says he normally weighs 160 lbs but is now 190lbs. He also attributes this to therapy with paliperidone.    Pt was started on paliperidone in March, with good symptomatic effect on paranoia and hallucinations. He gets his care through [facility].    He was seen in the ED and in urgent care for penile discharge thought to be due to trichomonas infection. Pt was treated with ceftriaxone and metronidazole and denies new or worsened discharge symptoms.    Otherwise, patient has no complaints. He is set to receive vaccination against the novel coronavirus in the coming weeks. He denies any fevers, chest pain, shortness of breath, abdominal pain, nausea, vomiting, dysuria.     Aside from schizophrenia, he denies other chronic disorders.    PMHx:  Past Medical History:   Diagnosis Date     ADHD (attention deficit hyperactivity disorder)      Depression      Phimosis      PTSD (post-traumatic stress disorder)        PSHx:  Past Surgical History:   Procedure Laterality Date     CIRCUMCISION  5/30/2013    Procedure: CIRCUMCISION;  Circumcision;  Surgeon: Lynne Haro MD;  Location: [facility] OR     HERNIA REPAIR      Per patient, surgery was when he was 4 or 5 years of age       FamHx:  Family History   Problem Relation Age of Onset     Mental Illness Mother      Intellectual  Disability (Mental Retardation) Mother      Mental Illness Maternal Grandmother      Mental Illness Brother      Intellectual Disability (Mental Retardation) Brother      Unknown/Adopted Father        Allergies:     Allergies   Allergen Reactions     No Known Drug Allergies        Meds:    Current Outpatient Medications:      INVEGA SUSTENNA 156 MG/ML ALANA injection, , Disp: , Rfl:      INVEGA SUSTENNA 234 MG/1.5ML ALANA, , Disp: , Rfl:     SocHx:  Social History     Socioeconomic History     Marital status: Single     Spouse name: None     Number of children: None     Years of education: None     Highest education level: None   Occupational History     None   Social Needs     Financial resource strain: None     Food insecurity     Worry: None     Inability: None     Transportation needs     Medical: None     Non-medical: None   Tobacco Use     Smoking status: Current Every Day Smoker     Packs/day: 0.25     Years: 4.00     Pack years: 1.00     Smokeless tobacco: Never Used     Tobacco comment: no tobacco - maijuana occassionally   Substance and Sexual Activity     Alcohol use: Yes     Drug use: Yes     Types: Marijuana     Comment: occassionally      Sexual activity: Never   Lifestyle     Physical activity     Days per week: None     Minutes per session: None     Stress: None   Relationships     Social connections     Talks on phone: None     Gets together: None     Attends Holiness service: None     Active member of club or organization: None     Attends meetings of clubs or organizations: None     Relationship status: None     Intimate partner violence     Fear of current or ex partner: None     Emotionally abused: None     Physically abused: None     Forced sexual activity: None   Other Topics Concern     Parent/sibling w/ CABG, MI or angioplasty before 65F 55M? Not Asked   Social History Narrative     None       Problem, Medication and Allergy Lists were reviewed and are current.  Patient is a new patient to  this clinic and so  I reviewed/updated the Past Medical History, the Family History and the Social History.          Review of Systems:     ROS  I have personally reviewed and updated the complete ROS on the day of the visit.           Physical Exam:   /78   Pulse 86   Wt 86.5 kg (190 lb 9.6 oz)   SpO2 99%   BMI 24.81 kg/m    Body mass index is 24.81 kg/m .  Vitals were reviewed       GENERAL APPEARANCE: healthy, alert and no distress     EYES: EOMI,  PERRL     HENT: ear canals and TM's normal and nose and mouth without ulcers or lesions     NECK: no adenopathy, no asymmetry, masses, or scars and thyroid normal to palpation     RESP: lungs clear to auscultation - no rales, rhonchi or wheezes     CV: regular rates and rhythm, normal S1 S2, no S3 or S4 and no murmur, click or rub     ABDOMEN:  soft, nontender, no HSM or masses and bowel sounds normal     MS: extremities normal- no gross deformities noted, no evidence of inflammation in joints, FROM in all extremities.     SKIN: no suspicious lesions or rashes     NEURO: Normal strength and tone, sensory exam grossly normal, mentation intact and speech normal     PSYCH: mentation appears normal. and affect normal/bright     LYMPHATICS: No cervical adenopathy        Results:     Orders Only on 04/19/2019   Component Date Value Ref Range Status     WBC 04/19/2019 5.5  4.0 - 11.0 10e9/L Final     RBC Count 04/19/2019 4.96  4.4 - 5.9 10e12/L Final     Hemoglobin 04/19/2019 14.2  13.3 - 17.7 g/dL Final     Hematocrit 04/19/2019 43.3  40.0 - 53.0 % Final     MCV 04/19/2019 87  78 - 100 fl Final     MCH 04/19/2019 28.6  26.5 - 33.0 pg Final     MCHC 04/19/2019 32.8  31.5 - 36.5 g/dL Final     RDW 04/19/2019 13.6  10.0 - 15.0 % Final     Platelet Count 04/19/2019 174  150 - 450 10e9/L Final       Lab Results   Component Value Date    WBC 5.5 04/19/2019    WBC 3.4 (L) 09/17/2018    WBC 4.2 06/27/2017    HGB 14.2 04/19/2019    HGB 15.8 09/17/2018    HGB 16.4 06/27/2017     HCT 43.3 04/19/2019    HCT 48.5 09/17/2018    HCT 48.3 06/27/2017     04/19/2019     09/17/2018     06/27/2017     09/17/2018     06/27/2017    POTASSIUM 4.3 09/17/2018    POTASSIUM 4.0 06/27/2017    CHLORIDE 104 09/17/2018    CHLORIDE 105 06/27/2017    CO2 29 09/17/2018    CO2 29 06/27/2017    BUN 8 09/17/2018    BUN 14 06/27/2017    CR 1.06 (H) 09/17/2018    CR 1.16 (H) 06/27/2017    GLC 81 09/17/2018    GLC 94 06/27/2017    GLC 91 02/28/2017    SED 4 06/27/2017    AST 14 09/17/2018    AST 14 06/27/2017    ALT 18 09/17/2018    ALT 20 06/27/2017    ALKPHOS 93 09/17/2018    ALKPHOS 102 06/27/2017    BILITOTAL 1.3 09/17/2018    BILITOTAL 1.3 06/27/2017       Assessment and Plan     Rohan Ruggiero is a 20-year-old man with schizophrenia, schizoaffective disorder (depressive type), PTSD, who presents to clinic to establish care and with a major complaint of abnormal ejaculation.    Diagnoses and all orders for this visit:    Abnormal ejaculation  Unclear what may be driving this presentation. Possible this is related to paliperidone. Also possible there is an organic ED/abnormal ejaculation component. Lower suspicion for infectious etiology. Would not recommend cessation of paliperidone at this time given how otherwise beneficial this has been in terms of psychosis & paranoia symptoms, and the possibility that there may be other explanations. If related to paliperidone would recommend exploring alternatives with patient's psychiatrist.  -     Adult Urology Referral; Future        -     Advised patient to discuss symptoms with psychiatrist at [facility] maintenance  Patient reports recent weight gain iso paliperidone initiation. Will obtain lipid profile, HgbA1c, and TSH to establish baseline (patient not interested in getting labs today).  -     Lipid Profile FASTING; Future  -     Hemoglobin A1c; Future  -     TSH with free T4 reflex; Future      Options for  treatment and follow-up care were reviewed with the patient. Rohan Ruggiero engaged in the decision making process and verbalized understanding of the options discussed and agreed with the final plan.    Héctor Dubois MD PhD  May 25, 2021    Pt was seen and plan of care discussed with Dr Bo.     Attending Addendum:  Patient seen and examined with resident in clinic today.  Pertinent portions of history and exam were independently verified by myself.  I agree with the exam and plan as outlined above with the following modifications: none.  Sada Bo MD  Internal Medicine

## 2021-05-25 NOTE — NURSING NOTE
Chief Complaint   Patient presents with     Establish Care     Physical     annual physical     Sexual Problem     unable to produce sperm, about 2-3 months, no other associated symptoms but did have trich w/ penile discharge in April, has resolved       SHONNA Hoyt at 1:12 PM on 5/25/2021

## 2021-05-25 NOTE — TELEPHONE ENCOUNTER
[facility] Call Center    Phone Message    May a detailed message be left on voicemail: yes     Reason for Call: Appointment Intake    Referring Provider Name: Sada Bo MD   Diagnosis and/or Symptoms: Abnormal ejaculation     Action Taken: Message routed to:  [facility] ([facility] Urology    Travel Screening: Not Applicable

## 2021-05-26 ASSESSMENT — ANXIETY QUESTIONNAIRES: GAD7 TOTAL SCORE: 7

## 2021-05-26 NOTE — TELEPHONE ENCOUNTER
Left a very detailed VM that Dr Alvarez is booked out in person till Aug and Sept for a video visit. If they would like to scheduled to please call back. Clinic number left

## 2021-09-10 ENCOUNTER — HOSPITAL ENCOUNTER (EMERGENCY)
Facility: CLINIC | Age: 21
Discharge: LEFT BEFORE DISCHARGE | End: 2021-09-10
Payer: COMMERCIAL

## 2021-09-10 VITALS
TEMPERATURE: 98.1 F | WEIGHT: 205 LBS | HEART RATE: 79 BPM | BODY MASS INDEX: 26.68 KG/M2 | DIASTOLIC BLOOD PRESSURE: 67 MMHG | OXYGEN SATURATION: 100 % | SYSTOLIC BLOOD PRESSURE: 108 MMHG | RESPIRATION RATE: 16 BRPM

## 2021-09-10 NOTE — ED NOTES
Individual decided he no longer wished to be seen.  He signed Declination of Medical Screening Examination form.  He was escorted by his mother.  He was encouraged to return to ED for any concerns.  Dismissed as left without being seen after triage.

## 2021-09-19 ENCOUNTER — HEALTH MAINTENANCE LETTER (OUTPATIENT)
Age: 21
End: 2021-09-19

## 2022-01-09 ENCOUNTER — HEALTH MAINTENANCE LETTER (OUTPATIENT)
Age: 22
End: 2022-01-09

## 2022-11-20 ENCOUNTER — HEALTH MAINTENANCE LETTER (OUTPATIENT)
Age: 22
End: 2022-11-20

## 2023-04-15 ENCOUNTER — HEALTH MAINTENANCE LETTER (OUTPATIENT)
Age: 23
End: 2023-04-15

## 2023-04-17 ENCOUNTER — OFFICE VISIT (OUTPATIENT)
Dept: FAMILY MEDICINE | Facility: CLINIC | Age: 23
End: 2023-04-17
Payer: COMMERCIAL

## 2023-04-17 VITALS
WEIGHT: 152.4 LBS | OXYGEN SATURATION: 99 % | HEART RATE: 103 BPM | BODY MASS INDEX: 19.83 KG/M2 | DIASTOLIC BLOOD PRESSURE: 66 MMHG | TEMPERATURE: 97.7 F | RESPIRATION RATE: 24 BRPM | SYSTOLIC BLOOD PRESSURE: 103 MMHG

## 2023-04-17 DIAGNOSIS — R36.9 PENILE DISCHARGE: Primary | ICD-10-CM

## 2023-04-17 PROCEDURE — 87591 N.GONORRHOEAE DNA AMP PROB: CPT | Performed by: STUDENT IN AN ORGANIZED HEALTH CARE EDUCATION/TRAINING PROGRAM

## 2023-04-17 PROCEDURE — 99203 OFFICE O/P NEW LOW 30 MIN: CPT | Mod: 25 | Performed by: STUDENT IN AN ORGANIZED HEALTH CARE EDUCATION/TRAINING PROGRAM

## 2023-04-17 PROCEDURE — 96372 THER/PROPH/DIAG INJ SC/IM: CPT | Performed by: FAMILY MEDICINE

## 2023-04-17 PROCEDURE — 87491 CHLMYD TRACH DNA AMP PROBE: CPT | Performed by: STUDENT IN AN ORGANIZED HEALTH CARE EDUCATION/TRAINING PROGRAM

## 2023-04-17 NOTE — PROGRESS NOTES
Assessment & Plan     Penile discharge  On chart review, noted that he was last seen on 10/2022 in the emergency department, was treated presumptively for gonorrhea, which ended up being positive.  He did not test positive for chlamydia at that time.  Given similar symptoms at this time, will presumptively treat with ceftriaxone one-time dose, and test for gonorrhea and chlamydia.  If chlamydia is also positive, will treat with doxycycline twice daily for 7 days.  - NEISSERIA GONORRHOEA PCR  - CHLAMYDIA TRACHOMATIS PCR  - cefTRIAXone (ROCEPHIN) in NS for IM administration 500 mg    Review of prior external note(s) from - Previous ED visit  Review of the result(s) of each unique test - Gonorrhea, chlamydia  20 minutes spent by me on the date of the encounter doing chart review, history and exam, documentation and further activities per the note       Nicotine/Tobacco Cessation:  He reports that he has been smoking. He has a 1.00 pack-year smoking history. He has never used smokeless tobacco.  Nicotine/Tobacco Cessation Plan:   Will follow up at later visit    Yomaira Richard MD PGY3  [facility]  Precepted with Dr. Ashley    Subjective   Rohan Ruggiero is a 22 year old who presents for the following health issues     HPI     Presents for few day history of penile discharge, burning with urination, irritation around the glans.  Patient notes that he was previously treated for gonorrhea in 10/2022, at that time had similar symptoms.  Denies any fevers, abdominal pain, nausea, vomiting, diarrhea, any other concerns at this time.  He notes that he has no new sexual partners; however, he does not believe his current sexual partner has been treated for gonorrhea previously.    Review of Systems   Complete ROS normal aside noted in HPI      Objective    /66   Pulse 103   Temp 97.7  F (36.5  C) (Tympanic)   Resp 24   Wt 69.1 kg (152 lb 6.4 oz)   SpO2 99%   BMI 19.83 kg/m    Body mass index  is 19.83 kg/m .    Physical Exam   GENERAL: healthy, alert and no distress  RESP: lungs clear to auscultation - no rales, rhonchi or wheezes  CV: regular rate and rhythm, normal S1 S2, no S3 or S4, no murmur, click or rub, no peripheral edema and peripheral pulses strong  ABDOMEN: soft, nontender, no hepatosplenomegaly, no masses and bowel sounds normal  MS: no gross musculoskeletal defects noted, no edema    Gonorrhea, chlamydia pending    ----- Service Performed and Documented by Resident or Fellow ------

## 2023-04-17 NOTE — NURSING NOTE
Clinic Administered Medication Documentation        Patient was given Ceftriaxone. Prior to medication administration, verified patient's identity using patient s name and date of birth. Please see MAR and medication order for additional information. Patient instructed to remain in clinic for 15 minutes and report any adverse reaction to staff immediately.    Vial/Syringe: Single dose vial. Was entire vial of medication used? Yes    Name of provider who requested the medication administration: Dr. Richard  Name of provider on site (faculty or community preceptor) at the time of performing the medication administration: Dr. Ashley    Date of next administration: N/A  Date of next office visit with provider to renew medication plan (must be seen annually): N/A

## 2023-04-17 NOTE — PROGRESS NOTES
Preceptor Attestation:   Patient seen, evaluated and discussed with the resident. I have verified the content of the note, which accurately reflects my assessment of the patient and the plan of care.   Supervising Physician:  Armando Ashley MD

## 2023-04-18 LAB
C TRACH DNA SPEC QL NAA+PROBE: NEGATIVE
N GONORRHOEA DNA SPEC QL NAA+PROBE: POSITIVE

## 2023-07-03 ENCOUNTER — HOSPITAL ENCOUNTER (EMERGENCY)
Facility: CLINIC | Age: 23
Discharge: HOME | End: 2023-07-03
Attending: EMERGENCY MEDICINE | Admitting: EMERGENCY MEDICINE
Payer: COMMERCIAL

## 2023-07-03 VITALS
OXYGEN SATURATION: 100 % | DIASTOLIC BLOOD PRESSURE: 76 MMHG | WEIGHT: 150 LBS | HEART RATE: 83 BPM | TEMPERATURE: 98.7 F | RESPIRATION RATE: 16 BRPM | SYSTOLIC BLOOD PRESSURE: 112 MMHG | BODY MASS INDEX: 19.25 KG/M2 | HEIGHT: 74 IN

## 2023-07-03 DIAGNOSIS — R44.0 AUDITORY HALLUCINATIONS: ICD-10-CM

## 2023-07-03 DIAGNOSIS — F11.23 OPIOID DEPENDENCE WITH WITHDRAWAL (H): ICD-10-CM

## 2023-07-03 LAB
AMPHETAMINES UR QL SCN: ABNORMAL
BARBITURATES UR QL SCN: ABNORMAL
BENZODIAZ UR QL SCN: ABNORMAL
BZE UR QL SCN: ABNORMAL
CANNABINOIDS UR QL SCN: ABNORMAL
OPIATES UR QL SCN: ABNORMAL

## 2023-07-03 PROCEDURE — 99283 EMERGENCY DEPT VISIT LOW MDM: CPT | Performed by: EMERGENCY MEDICINE

## 2023-07-03 PROCEDURE — 99284 EMERGENCY DEPT VISIT MOD MDM: CPT | Performed by: EMERGENCY MEDICINE

## 2023-07-03 PROCEDURE — 250N000013 HC RX MED GY IP 250 OP 250 PS 637: Performed by: EMERGENCY MEDICINE

## 2023-07-03 PROCEDURE — G2213 INITIAT MED ASSIST TX IN ER: HCPCS | Performed by: EMERGENCY MEDICINE

## 2023-07-03 PROCEDURE — 80307 DRUG TEST PRSMV CHEM ANLYZR: CPT | Performed by: EMERGENCY MEDICINE

## 2023-07-03 PROCEDURE — 250N000011 HC RX IP 250 OP 636: Performed by: EMERGENCY MEDICINE

## 2023-07-03 RX ORDER — BUPRENORPHINE AND NALOXONE 8; 2 MG/1; MG/1
1 FILM, SOLUBLE BUCCAL; SUBLINGUAL DAILY
Status: DISCONTINUED | OUTPATIENT
Start: 2023-07-03 | End: 2023-07-03 | Stop reason: HOSPADM

## 2023-07-03 RX ORDER — CLONIDINE HYDROCHLORIDE 0.1 MG/1
0.1 TABLET ORAL ONCE
Status: COMPLETED | OUTPATIENT
Start: 2023-07-03 | End: 2023-07-03

## 2023-07-03 RX ORDER — ONDANSETRON 4 MG/1
4 TABLET, ORALLY DISINTEGRATING ORAL ONCE
Status: COMPLETED | OUTPATIENT
Start: 2023-07-03 | End: 2023-07-03

## 2023-07-03 RX ORDER — BUPRENORPHINE AND NALOXONE 8; 2 MG/1; MG/1
1 FILM, SOLUBLE BUCCAL; SUBLINGUAL DAILY
Qty: 30 FILM | Refills: 0 | Status: CANCELLED | OUTPATIENT
Start: 2023-07-03

## 2023-07-03 RX ORDER — KETOROLAC TROMETHAMINE 15 MG/ML
15 INJECTION, SOLUTION INTRAMUSCULAR; INTRAVENOUS ONCE
Status: COMPLETED | OUTPATIENT
Start: 2023-07-03 | End: 2023-07-03

## 2023-07-03 RX ORDER — HYDROXYZINE HYDROCHLORIDE 25 MG/1
25 TABLET, FILM COATED ORAL EVERY 6 HOURS PRN
Status: DISCONTINUED | OUTPATIENT
Start: 2023-07-03 | End: 2023-07-03 | Stop reason: HOSPADM

## 2023-07-03 RX ORDER — HYDROXYZINE HYDROCHLORIDE 50 MG/1
50 TABLET, FILM COATED ORAL EVERY 6 HOURS PRN
Status: DISCONTINUED | OUTPATIENT
Start: 2023-07-03 | End: 2023-07-03 | Stop reason: HOSPADM

## 2023-07-03 RX ADMIN — ONDANSETRON 4 MG: 4 TABLET, ORALLY DISINTEGRATING ORAL at 11:12

## 2023-07-03 RX ADMIN — BUPRENORPHINE AND NALOXONE 1 FILM: 8; 2 FILM, SOLUBLE BUCCAL; SUBLINGUAL at 11:12

## 2023-07-03 RX ADMIN — CLONIDINE HYDROCHLORIDE 0.1 MG: 0.1 TABLET ORAL at 11:12

## 2023-07-03 ASSESSMENT — ACTIVITIES OF DAILY LIVING (ADL): ADLS_ACUITY_SCORE: 33

## 2023-07-03 NOTE — ED TRIAGE NOTES
States that he has been smoking fentanyl, last used yesterday, having back pain feels cold and shaky per pt, has been off his meds since February per mother     Triage Assessment     Row Name 07/03/23 1008       Triage Assessment (Adult)    Airway WDL WDL       Respiratory WDL    Respiratory WDL WDL       Skin Circulation/Temperature WDL    Skin Circulation/Temperature WDL WDL       Cardiac WDL    Cardiac WDL WDL       Peripheral/Neurovascular WDL    Peripheral Neurovascular WDL WDL       Cognitive/Neuro/Behavioral WDL    Cognitive/Neuro/Behavioral WDL WDL

## 2023-07-03 NOTE — DISCHARGE INSTRUCTIONS
You are welcome back at any time for opioid withdrawal or treatment.  We have inpatient units to help you with detox.    You are agreeing to leave AGAINST MEDICAL ADVICE from treatment and evaluation and are welcome back anytime

## 2023-07-03 NOTE — ED NOTES
Pt in the room calling nurse writer went to the room and pt stated he wanted to be discharged.  Pt started walking towards exit. Security present and was able to walk pt back to room to talk to the doctor.

## 2023-07-03 NOTE — ED PROVIDER NOTES
"    [facility] EMERGENCY DEPARTMENT ([facility])    7/03/23      ED PROVIDER NOTE  ED 9  History     Chief Complaint   Patient presents with     Hallucinations     Having auditory and visual hallucination,      The history is provided by the patient, medical records and a parent.     Rohan Ruggiero is a 22 year old male with history of schizoaffective disorder depressive type, prior substance use (marijuana) psychosis, medication nonadherence who presents the emergency department with hallucinations in setting of Fentanyl use. He has been smoking fentanyl daily, last used yesterday. Patient endorses full body pains, most pronounced in his back along with the feeling of coldness and being tired.  Patient endorses hearing voices. I took a full history from the mother at the bedside who endorses that patient has had auditory hallucinations as part of his ongoing mental health and has not taken medications for many months. She reports that patient had last been taking a monthly injection for his mood stabilization and no daily medications.  I reviewed a note from July 18, 2019 from psychiatric providers who report patient has paranoid schizophrenia, schizoaffective disorder depressive type, however does not take his home medications.     A complete review of systems was attempted but limited due to nausea and somnlence.    Physical Exam   BP: 112/76  Pulse: 83  Temp: 98.7  F (37.1  C)  Resp: 16  Height: 188 cm (6' 2\")  Weight: 68 kg (150 lb)  SpO2: 100 %     Physical Exam  Constitutional:       General: He is not in acute distress.     Appearance: He is well-developed. He is not diaphoretic.      Comments: Lying in bed, speaking upon verbal stimuli   HENT:      Head: Normocephalic and atraumatic.   Eyes:      General: No scleral icterus.  Pulmonary:      Effort: Pulmonary effort is normal.      Breath sounds: Normal breath sounds.   Musculoskeletal:      Cervical back: Normal range of motion and neck supple. "   Skin:     General: Skin is warm and dry.      Findings: No rash.      Comments: piloerection   Neurological:      Mental Status: He is alert and oriented to person, place, and time.   Psychiatric:         Attention and Perception: He perceives auditory hallucinations.       ED Course, Procedures, & Data      Procedures       ED Course Selections: -----  Initiation of Medication for the Treatment of Opioid Use Disorder (OUD) in the Emergency Department (ED)  [facility]      Assessment:   Opioid(s) used: fentanyl   Amount: smoking daily  Frequency: daily  Route: smoked  Duration: months  Last use: yesterday    Other substance(s) with ongoing use: denies    The patient meets the following DSM-V criteria for Opioid Use Disorder (OUD):   Persistent desire or unsuccessful efforts to cut down  Failure to fulfill obligations at work, school, or home    (Severity: Mild: 2-3 criteria, Moderate: 4-5 criteria. Severe: 6 or more criteria)  Based on my assessment, the patient has Severe OUD.     Medication Initiated in the ED:  In the ED, treatment for OUD was initiated. The patient was given 1 dose(s) of 8mg buprenorphine while in the ED.     Upon ED discharge, outpatient prescription treatment for OUD was initiated.   The patient was prescribed Suboxone and naloxone.      Referral to Ongoing Care and Supportive Services:   Upon ED discharge, the patient was referred to Addiction Medicine for ongoing care and supportive services. The patient was also provided with information on community resources for various supportive services for OUD, and advised to return to the ED if having worsening symptoms.   -----           Results for orders placed or performed during the hospital encounter of 07/03/23   Drug abuse screen 1 urine (ED)     Status: Abnormal   Result Value Ref Range    Amphetamines Urine Screen Negative Screen Negative    Barbituates Urine Screen Negative Screen Negative    Benzodiazepine Urine Screen Negative  Screen Negative    Cannabinoids Urine Screen Negative Screen Negative    Cocaine Urine Screen Positive (A) Screen Negative    Opiates Urine Screen Negative Screen Negative   Urine Drugs of Abuse Screen     Status: Abnormal    Narrative    The following orders were created for panel order Urine Drugs of Abuse Screen.  Procedure                               Abnormality         Status                     ---------                               -----------         ------                     Drug abuse screen 1 urin...[[id number]]  Abnormal            Final result                 Please view results for these tests on the individual orders.     Medications   buprenorphine HCl-naloxone HCl (SUBOXONE) 8-2 MG per film 1 Film (1 Film Sublingual $Given 7/3/23 1112)   hydrOXYzine (ATARAX) tablet 25 mg (has no administration in time range)     Or   hydrOXYzine (ATARAX) tablet 50 mg (has no administration in time range)   ondansetron (ZOFRAN ODT) ODT tab 4 mg (4 mg Oral $Given 7/3/23 1112)   cloNIDine (CATAPRES) tablet 0.1 mg (0.1 mg Oral $Given 7/3/23 1112)   ketorolac (TORADOL) injection 15 mg (15 mg Intramuscular Not Given 7/3/23 1124)     Labs Ordered and Resulted from Time of ED Arrival to Time of ED Departure   DRUG ABUSE SCREEN 1 URINE (ED) - Abnormal       Result Value    Amphetamines Urine Screen Negative      Barbituates Urine Screen Negative      Benzodiazepine Urine Screen Negative      Cannabinoids Urine Screen Negative      Cocaine Urine Screen Positive (*)     Opiates Urine Screen Negative       No orders to display          Critical care was not performed.     Medical Decision Making  The patient's presentation was of high complexity (a chronic illness severe exacerbation, progression, or side effect of treatment).    The patient's evaluation involved:  an assessment requiring an independent historian (see separate area of note for details)  ordering and/or review of 3+ test(s) in this encounter (see separate  area of note for details)    The patient's management necessitated high risk (a decision regarding hospitalization).      Assessment & Plan       Patient has acute opioid withdrawal in the setting of daily fentanyl use. Hemodynamically stable. Per mother's report at bedside patient has decompensated schizoaffective disorder with auditory hallucinations which are active. Plan is to initiate induction of Suboxone and treat opioid acute opioid withdrawal once physically more awake. Will engage DEC  for psychiatric evaluation; patient may require inpatient admission pending his symptoms.     12:19 PM Patient is awake, alert and oriented x person, place and year. Witness by foster mother at bedside. Patient is not holdable as he desires to be discharged and understands risks and benefits of further ED treatment and admission. Not suicidal, denies auditory hallucinations now. Welcome back at any time, will discharge.     This part of the document was transcribed by Marina Lobato, Medical Scribe.      I have reviewed the nursing notes. I have reviewed the findings, diagnosis, plan and need for follow up with the patient.    New Prescriptions    No medications on file       Final diagnoses:   Opioid dependence with withdrawal (H)   Auditory hallucinations       Davis Mayorga MD   [facility] EMERGENCY DEPARTMENT  7/3/2023     Davis Mayorga MD  07/04/23 0198

## 2023-07-03 NOTE — CONSULTS
Patient refused to participate in DEC assessment. Patient was observed posturing and screaming at staff. Patient requested immediate discharge. Patient denied any SI or HI. Patient has auditory hallucinations within the context of medication non-compliance. Patient was evaluated by attending physician. Patient was not found holdable and will be discharged.

## 2023-07-03 NOTE — ED NOTES
The following information was received from Lita Calvert whose relationship to the patient is foster mother. Information was obtained via phone. Their phone number is [phone] and they last had contact with patient on this morning when she brought him to the hospital  Ms Calvert states that she has cared for the pt since he was 4 years old and entered into foster care in her home  Reports the pt is his own guardian.  .States pt has a hx of schizophrenia.  States that the pt was receiving IM Invega since age 16. Reports he has been off his medication since February 2023.  States that since February 2023 he has been homeless.  Reports patient has been using Fentanyl for the past two years.      What happened today: States that the pt called her yesterday and she went and picked him up in [facility].  While driving from [facility] to the hospital yesterday, the pt became agitated, hitting the car door, demanding she buy him drigs.  States that she brought him to the ED at [facility] yesterday and he took off from her car.  States that this morning, the pt called her from his bio-mother's house and asked her to come and pick him up and bring him to the hospital.  Ms Calvert states that she thinks the pt has been using.    What is different about patient's functioning: Not able to care for self, using fentanyl, homeless    Concern about alcohol/drug use: Yes Reports the pt has been using fentanyl for the past two years.      What do you think the patient needs: Intensive/inpatient MI/CD treatment.      Has patient made comments about wanting to kill themselves/others:  Yes States that two weeks ago the pt made statements about killing himself and his bio mother.  States he has made passive SI statements yesterday.    If d/c is recommended, can they take part in safety/aftercare planning: Yes but cannot come to her house and she thinks he needs to be admitted somewhere.    Other information: She has stepped out to get a  kiki and is returning to the hospital.      ABRAM Dang, LICSW

## 2023-09-27 ENCOUNTER — HOSPITAL ENCOUNTER (EMERGENCY)
Facility: CLINIC | Age: 23
Discharge: HOME | End: 2023-09-27
Attending: FAMILY MEDICINE | Admitting: FAMILY MEDICINE
Payer: COMMERCIAL

## 2023-09-27 VITALS
DIASTOLIC BLOOD PRESSURE: 79 MMHG | HEIGHT: 74 IN | RESPIRATION RATE: 16 BRPM | TEMPERATURE: 98.9 F | WEIGHT: 169 LBS | SYSTOLIC BLOOD PRESSURE: 115 MMHG | HEART RATE: 83 BPM | BODY MASS INDEX: 21.69 KG/M2 | OXYGEN SATURATION: 100 %

## 2023-09-27 DIAGNOSIS — F19.10 POLYSUBSTANCE ABUSE (H): ICD-10-CM

## 2023-09-27 DIAGNOSIS — F25.9 SCHIZOAFFECTIVE DISORDER, UNSPECIFIED TYPE (H): ICD-10-CM

## 2023-09-27 PROBLEM — F12.90 CANNABIS USE, UNSPECIFIED, UNCOMPLICATED: Status: ACTIVE | Noted: 2023-09-27

## 2023-09-27 PROBLEM — F25.1 SCHIZOAFFECTIVE DISORDER, DEPRESSIVE TYPE (H): Status: ACTIVE | Noted: 2023-09-27

## 2023-09-27 PROBLEM — F11.10 OPIOID ABUSE, CONTINUOUS (H): Status: ACTIVE | Noted: 2023-09-27

## 2023-09-27 PROBLEM — F14.10 COCAINE ABUSE, CONTINUOUS (H): Status: ACTIVE | Noted: 2023-09-27

## 2023-09-27 PROBLEM — F41.1 GAD (GENERALIZED ANXIETY DISORDER): Status: ACTIVE | Noted: 2023-09-27

## 2023-09-27 PROCEDURE — 99284 EMERGENCY DEPT VISIT MOD MDM: CPT | Performed by: FAMILY MEDICINE

## 2023-09-27 RX ORDER — QUETIAPINE FUMARATE 100 MG/1
100 TABLET, FILM COATED ORAL AT BEDTIME
Qty: 5 TABLET | Refills: 0 | Status: SHIPPED | OUTPATIENT
Start: 2023-09-27

## 2023-09-27 RX ORDER — PALIPERIDONE 6 MG/1
6 TABLET, EXTENDED RELEASE ORAL EVERY MORNING
Qty: 5 TABLET | Refills: 0 | Status: SHIPPED | OUTPATIENT
Start: 2023-09-27

## 2023-09-27 RX ORDER — POLYETHYLENE GLYCOL 3350 17 G/17G
1 POWDER, FOR SOLUTION ORAL DAILY
Qty: 527 G | Refills: 0 | Status: SHIPPED | OUTPATIENT
Start: 2023-09-27 | End: 2023-10-27

## 2023-09-27 ASSESSMENT — ACTIVITIES OF DAILY LIVING (ADL): ADLS_ACUITY_SCORE: 35

## 2023-09-27 NOTE — CONSULTS
Diagnostic Evaluation Consultation  Crisis Assessment    Patient Name: Rohan Ruggiero  Age:  23 year old  Legal Sex: male  Gender Identity: male  Pronouns:   Race:    Black or   Black or   Ethnicity: Not  or   Language: English      Patient was assessed: Virtual: iPad Crisis Assessment Start Time: 1223 Crisis Assessment Stop Time: 1255  Patient location: [facility] EMERGENCY DEPARTMENT                             [facility]-    Referral Data and Chief Complaint  Rohan Ruggiero presents to the ED with family/friends. Patient is presenting to the ED for the following concerns: Significant behavioral change, Substance use, Anxiety, Depression, Paranoia.   Factors that make the mental health crisis life threatening or complex are:  Pt has been homeless since March of this year as he lost his housing due to substance abuse.  Pt stopped taking his psychiatric medications since February of this year.  Pt has limited coping skills, impaired judgment and daily functioning.  Pt has no current established outpatient mental health service providers as he has a history of poor follow up..      Informed Consent and Assessment Methods  Explained the crisis assessment process, including applicable information disclosures and limits to confidentiality, assessed understanding of the process, and obtained consent to proceed with the assessment.  Assessment methods included conducting a formal interview with patient, review of medical records, collaboration with medical staff, and obtaining relevant collateral information from family and community providers when available.  : done     Patient response to interventions: eager to participate, acceptance expressed, verbalizes understanding  Coping skills were attempted to reduce the crisis:  fishing, playing basketball, swimming, watching TV, movies and listening to music, working, reading, writing, arts and crafts, deep breathing  "exercise, meditation, affirmations, getting good sleep/rest.     History of the Crisis   Pt is a 23 year old Black male with a history of schizophrenia and DRE.  Pt was brought to the ER today by his guardian, due to worsening of depression, anxiety, paranoia and hallucinations.  Pt remarked, \"To get me injection for schizophrenia.\" as his reason for visiting the ER today.  Pt shared he used to take Invega injection to manage his mental health symptoms but stopped taking it since February of this year as he felt he did not need it and was using illicit subsances.  Pt endorsed increased depression, worry racing thoughts, and anxiety.  Pt shared he mostly worried about people doing stuff when he was not looking, super natural things like ghosts, spirits trying to get to him.  Pt reported having disrupted sleep but normal appetite.  Pt endorsed intermittent paranoia as he felt someone was watching him, following him and ought to harm him.  Pt endorsed visual hallucination of seeing shadows, dark figures, forms, ball of energy, lights and fire.  Pt reported having auditory hallucination as the voices were telling him, \"They want to kill you, you are a rapist.\"  Pt noted the voices also talking about rape, and tell him to kill others but not him.  Pt denied having suicidal and homicidal ideations.  Pt denied having access to firearms, history of SIB and previous suicide attempt.    Brief Psychosocial History  Family:  Single, Children no  Support System:  Guardian, Other (specify) (Pt identified his cousin and other family members as positive support.)  Employment Status:  unemployed  Source of Income:  unknown, none  Financial Environmental Concerns:  unemployed, No concerns identified  Current Hobbies:  arts/crafts, television/movies/videos, reading, writing/journaling/blogging, music, exercise/fitness, sports/team sports  Barriers in Personal Life:  mental health concerns, emotional concerns    Significant Clinical " History  Current Anxiety Symptoms:  panic attack, racing thoughts, excessive worry, anxious  Current Depression/Trauma:  apathy, difficulty concentrating, withdrawl/isolation, impaired decision making, crying or feels like crying, helplessness, hoplessness, sadness  Current Somatic Symptoms:     Current Psychosis/Thought Disturbance:  impulsive, high risk behavior, auditory hallucinations, visual hallucinations  Current Eating Symptoms:     Chemical Use History:  Alcohol: None  Opiates: Street buy pills (Pt reported using Fentanyl daily.)  Last Use:: 09/26/23  Cocaine:  (Pt reported using cocaine randomly.)  Last Use:: 09/06/23  Marijuana: Occasional  Last Use:: 09/26/23   Past diagnosis:  Anxiety Disorder, Depression, Schizophrenia  Family history:  Depression, Anxiety Disorder  Past treatment:  Psychiatric Medication Management, Inpatient Hospitalization  Details of most recent treatment:  Pt stopped taking his Invega medication since February, 2023.  Pt has a history of psychiatric hospitalization at [place] in 2018 and [facility] in 2022.  Other relevant history:  Pt reported his parents were never  but seprated, has one brother and one sister.  Pt reported he was single and has no children.  Pt was unemployed and has been homeless since March of this year.  Pt lost his housing but lost due to substance abuse.  Pt denied having significant medical conditions.  Pt reported a history of DWI as his legal issue.       Collateral Information  Is there collateral information: Yes     Collateral information name, relationship, phone number:  Lita Joaquin [phone]    What happened today: Lita reported Pt has been calling her 4 days ago to ask for help.  Lita reported he brought Pt to the ER 3 to 4x this past summer, but he eloped as he was high on substances.  However, Pt talked to her about getting serious help as he was willing to come to the ER for help today.     What is different about  patient's functioning: Lita reported Pt has been declining with his mental health symptoms and ongoing substance abuse.  Pt had his own apartment and mental health services set up through [facility], but he lost them due to ongoing substance abuse.  Pt has been homeless since March, 2023 and stopped taking his medication since February, 2023.     Concern about alcohol/drug use:      What do you think the patient needs:      Has patient made comments about wanting to kill themselves/others: no    If d/c is recommended, can they take part in safety/aftercare planning:  yes    Additional collateral information:  Lita said as long as Pt did not use substances on her property, he can stay with her or he can go to his cousin's place.  Lita reviewed and agreed with outpatient mental health service recommendations for Pt.     Risk Assessment  Quentin Suicide Severity Rating Scale Full Clinical Version:  Suicidal Ideation  Q1 Wish to be Dead (Lifetime): Yes  Q2 Non-Specific Active Suicidal Thoughts (Lifetime): Yes  3. Active Suicidal Ideation with any Methods (Not Plan) Without Intent to Act (Lifetime): No  Q4 Active Suicidal Ideation with Some Intent to Act, Without Specific Plan (Lifetime): No  Q5 Active Suicidal Ideation with Specific Plan and Intent (Lifetime): No  Q6 Suicide Behavior (Lifetime): no     Suicidal Behavior (Lifetime)  Actual Attempt (Lifetime): No  Has subject engaged in non-suicidal self-injurious behavior? (Lifetime): No  Interrupted Attempts (Lifetime): No  Aborted or Self-Interrupted Attempt (Lifetime): No  Preparatory Acts or Behavior (Lifetime): No    Quentin Suicide Severity Rating Scale Recent:   Suicidal Ideation (Recent)  Q1 Wished to be Dead (Past Month): yes  Q2 Suicidal Thoughts (Past Month): yes  Q3 Suicidal Thought Method: no  Q4 Suicidal Intent without Specific Plan: no  Q5 Suicide Intent with Specific Plan: no  Level of Risk per Screen: low risk  Intensity of Ideation  (Recent)  Frequency (Past 1 Month): Once a week  Suicidal Behavior (Recent)  Actual Attempt (Past 3 Months): No  Total Number of Actual Attempts (Past 3 Months): 0  Actual Attempt Description (Past 3 Months): None reported.  Has subject engaged in non-suicidal self-injurious behavior? (Past 3 Months): No  Interrupted Attempts (Past 3 Months): No  Total Number of Interrupted Attempts (Past 3 Months): 0  Interrupted Attempt Description (Past 3 Months): none reported.  Aborted or Self-Interrupted Attempt (Past 3 Months): No  Total Number of Aborted or Self-Interrupted Attempts (Past 3 Months): 0  Aborted or Self-Interrupted Attempt Description (Past 3 Months): None reported.  Preparatory Acts or Behavior (Past 3 Months): No  Total Number of Preparatory Acts (Past 3 Months): 0  Preparatory Acts or Behavior Description (Past 3 Months): None reported.    Environmental or Psychosocial Events: legal issues such as DWI, DUI, lawsuit, CPS involvement, etc., challenging interpersonal relationships, helplessness/hopelessness, unstable housing, homelessness, unemployment/underemployment, impulsivity/recklessness, other life stressors, excessive debt, poor finances, neither working nor attending school, recent life events (see comment), ongoing abuse of substances  Protective Factors: Protective Factors: help seeking, able to access care without barriers, constructive use of leisure time, enjoyable activities, resilience    Does the patient have thoughts of harming others? Feels Like Hurting Others: no  Previous Attempt to Hurt Others: no  Current presentation:  (Pt was calm, alert, oriented, engaged and cooperative.  Pt presented with coherent, slow rate of speech with soft volume.  Pt displayed constricted, depressed and anxious affect.)  Is the patient engaging in sexually inappropriate behavior?: no    Is the patient engaging in sexually inappropriate behavior?  no        Mental Status Exam   Affect: Constricted,  Flat  Appearance: Appropriate  Attention Span/Concentration: Attentive  Eye Contact: Engaged, Variable    Fund of Knowledge: Appropriate   Language /Speech Content: Fluent  Language /Speech Volume: Soft, Normal  Language /Speech Rate/Productions: Normal, Slow  Recent Memory: Variable  Remote Memory: Variable  Mood: Anxious, Apathetic, Depressed, Sad  Orientation to Person: Yes   Orientation to Place: Yes  Orientation to Time of Day: Yes  Orientation to Date: Yes     Situation (Do they understand why they are here?): Yes  Psychomotor Behavior: Normal, Underactive  Thought Content: Clear, Hallucinations, Paranoia  Thought Form: Intact, Paranoia     Mini-Cog Assessment  Number of Words Recalled:    Clock-Drawing Test:     Three Item Recall:    Mini-Cog Total Score:       Medication  Psychotropic medications:   Medication Orders - Psychiatric (From admission, onward)      Start     Dose/Rate Route Frequency Ordered Stop    09/27/23 0000  paliperidone ER (INVEGA) 6 MG 24 hr tablet         6 mg Oral EVERY MORNING 09/27/23 1355      09/27/23 0000  QUEtiapine (SEROQUEL) 100 MG tablet         100 mg Oral AT BEDTIME 09/27/23 1355               Current Care Team  Patient Care Team:  No Ref-Primary, Physician as PCP - General  Yomaira Roberts LICSW as  ( - Clinical)  Wil Gaines LICSW as  ( - Clinical)  Abby Tilley LGSW as Other (see comments)  Uma Trevino APRN CNP as Nurse Practitioner (Nurse Practitioner Psych/Mental Health)  Bridget Bliss MD as MD (Psychiatry)  Arjun Brown MD as MD (Psychiatry)    Diagnosis  Patient Active Problem List   Diagnosis Code    Balanitis N48.1    Penile pain N48.89    Paranoid delusion (H) F22    Psychosis (H) F29    Schizoaffective disorder, depressive type (H) F25.1    WOODY (generalized anxiety disorder) F41.1    Cannabis use, unspecified, uncomplicated F12.90    Opioid abuse, continuous (H) F11.10    Cocaine abuse,  "continuous (H) F14.10       Primary Problem This Admission  Active Hospital Problems    Schizoaffective disorder, depressive type (H)      WOODY (generalized anxiety disorder)      Cannabis use, unspecified, uncomplicated      Opioid abuse, continuous (H)      Cocaine abuse, continuous (H)        Clinical Summary and Substantiation of Recommendations   Pt presenting in the ER today due to worsening of depression, anxiety, paranoia and hallucinations.  Pt also reported wanting to get back on his medication treatment, Invega injection.  Pt stopped taking his medicaiton since February of this year and has been homeless since March of this year.  Pt endorsed increased depression, worry racing thoughts, and anxiety. Pt shared he mostly worried about people doing stuff when he was not looking, super natural things like ghosts, spirits trying to get to him. Pt reported having disrupted sleep but normal appetite. Pt endorsed intermittent paranoia as he felt someone was watching him, following him and ought to harm him. Pt endorsed visual hallucination of seeing shadows, dark figures, forms, ball of energy, lights and fire. Pt reported having auditory hallucination as the voices were telling him, \"They want to kill you, you are a rapist.\" Pt noted the voices also talking about rape, and tell him to kill others but not him. Pt denied having suicidal and homicidal ideations. Pt denied having access to firearms, history of SIB and previous suicide attempt.  Pt identified having incresed mental health symptoms as trigger to his current mental health crisis.  Pt was able to engage in his [facility] Aftercare plan as he felt safe to stay at either his guardian or cousin's place.  Pt was not imminent danger to himself or to others.  Pt was appropriate for outpatient services.                          Patient coping skills attempted to reduce the crisis:  fishing, playing basketball, swimming, watching TV, movies and listening to music, " working, reading, writing, arts and crafts, deep breathing exercise, meditation, affirmations, getting good sleep/rest.    Disposition  Recommended disposition: Individual Therapy, Medication Management, Inpatient Mental Health, Substance Abuse Disorder Treatment, Rule 25/DRE Assessment        Reviewed case and recommendations with attending provider. Attending Name: Héctor Jones MD reviewed and concurred with outpatient mental health service disposition.       Attending concurs with disposition: yes       Patient and/or validated legal guardian concurs with disposition:   yes (Pt declined to inpatient psychiatric servie but agreed to outpatient mental health service recommendations.)       Final disposition:  discharge    Legal status on admission:      Assessment Details   Total duration spent on the patient case in minutes: 30 min     CPT code(s) utilized: 80031 - Psychotherapy for Crisis - 60 (30-74*) min    Juan Carlos Wagoner [facility] CenterASIA, Psychotherapist  [facility] - Triage & Transition Services  Callback: [phone]

## 2023-09-27 NOTE — DISCHARGE INSTRUCTIONS
Aftercare Plan  If I am feeling unsafe or I am in a crisis, I will: reach out to my guardian, Lita Calvert, my cousin, other family members and Pending sale to [facility] crisis line for their support.  Contact my established care providers   Call the National Suicide Prevention Lifeline: 988  Go to the nearest emergency room   Call 911     Writer encourage Pt to take his future medications as prescribed and keep all of scheduled appointments with his outpatient service providers.  Writer also encouraged Pt to stop using illicit substances as they worsen his mental health symptoms.  Writer initially recommended inpatient psychiatric service to Pt, but he declined.  Writer recommended new outpatient psychiatry for medication management and individual therapy service to Pt which he agreed.  Pt has safe places he can stay at either his guardian, Lita's place or his cousin's place.  DEC coordinator will contact Pt within next 1 or 2 business days to ensure coordination of care and assistance with appointments.    Pt has his new virtual Outpatient therapy appointment scheduled with a following service provider:  Date: Thursday, 9/28/2023  Time: 12:00 pm - 1:00 pm  Provider: Jose Alfredo Thomas MA  [place]: [place] [address]  Phone: [phone]  Type: Teletherapy    Patient Instructions  If the patient is under the age of 12, we ask that only the parent or legal guardian attend the first session. Please arrive 10 minutes prior to your first appointment, and bring your insurance card and photo identification. For Teletherapy, a zoom link will be sent via text and email. Please check in to your patient portal to complete important information prior to the appt. As a reminder, you must be in [place] in order to receive telehealth services with us. Visit us at [employer]    Pt has his new virtual outpatient psychiatry, medication management appointment scheduled with a following service  provider:  Date: Friday, 9/29/2023  Time: 11:20 am - 12:20 pm  Provider: Chuy Mckinney  MSN  CNP,PMHNP,RN  Location: [facility], [address] [place] [address] [place] [address]  Phone: [phone]  Type: Telepsychiatry    Patient Instructions  All Intake appointments will be conducted via telehealth and must have access to video through smart phone or laptop/pc/tablet. You will be contacted by our office to set up the virtual meeting. If you have questions, please contact our office at [phone].      Warning signs that I or other people might notice when a crisis is developing for me: increased depression, isolation, cry, worry, racing thoughts, anxiety, paranoia, auditory and visual hallucinations, medication non-adherence, missing appointments, homelessness, using illicit substances, disrupted sleep and appetite.    Things I am able to do on my own to cope or help me feel better: fishing, playing basketball, swimming, watching TV, movies and listening to music, working, reading, writing, arts and crafts, deep breathing exercise, meditation, affirmations, getting good sleep/rest.     Things that I am able to do with others to cope or help me better: reaching out to supportive people     Things I can use or do for distraction: fishing, playing basketball, swimming, watching TV, movies and listening to music, working, reading, writing, arts and crafts, deep breathing exercise, meditation, affirmations, getting good sleep/rest.      Changes I can make to support my mental health and wellness: being with sober people and sober environment to promote sobriety.  Attending AA/NA support group, finding a sponsor.  Joining a peer support group through [facility] [place] to increase positive support system.  [facility] ([employer] [place] on Mental Illness) improves the lives of children and adults with mental illnesses and their families by providing free classes on mental illnesses and support groups for adults with  mental illnesses, parents and family members. For more information:  Phone: [phone]  Toll free: 3-709-WEVR-HELPS  Website: www.abusix.orghttp://www.abusix.org/      People in my life that I can ask for help: my guardian, Lita Calvert, my cousin and other family members.     Your [facility] has a mental health crisis team you can call 24/7: [facility] Mobile Crisis  [phone]     Other things that are important when I'm in crisis: support from my guardian, Lita and family.   National Suicide Prevention Lifeline at 988  Crisis Text Line: Free help is available 24/7 by texting HOME to 337369 or texting DONNA for help in [place].  The Wilber Project at 560-934-9347  [facility] [employer] Helpline at 919-818-2232    Additional resources and information: Below is a list of FREE Mental Health Options in the [facility] Area:    [facility] ([facility])  Serves those in emotional crisis with 24-hour, seven-day-a-week crisis counseling, assessment, referral, and medication management.   Suicidal: [phone] Consultation: [phone]  [address] [place] [address] 24/7 Crisis Intervention Center     Walk-in Counseling Center  [phone]  Serves those in need of free outpatient mental health care  Hours: Mon, Wed, Fri 1-3pm; Mon-Thurs 6:30-8:30pm    [facility]  [address] [place] [address]  [phone]     Substance Use Disorder Direct Access Resources    It is recommended that you abstain from all mood altering chemicals. Please contact the sober support hotline (177-534-3514) as needed; phones are answered 24 hours a day, 7 days a week.    To access substance use treatment you must have a comprehensive assessment completed to begin any treatment program.     If uninsured, please contact your county of residence for eligibility screen to substance use disorder evaluation and treatment:    [place] - [phone]   [place]  [phone]   [facility] [phone]    [place] - [phone]   [facility] [phone]   [place] - [phone]   [facility] [phone]   [place] - [phone]     If you have private insurance, call the customer service number on the back of your insurance card to find an in-network substance abuse use disorder assessment. The ideal provider will be a treatment facility, licensed in the state of [place].     Community DRE Evaluations: Clients may call their county for a full list of providers - Availability and services listed belo are subject to change, please call the provider to confirm    [facility]  [phone]  [address] [place] [address], [address]  *Please call the above number to schedule a comprehensive assessment for determination of level of care needs. In person and virtual appointments available Mon-Fri.    [facility], [address] [place] [address], First Floor, [address], [place] [address] (next to the outpatient lab)    Phone: [phone]   Provides bridging services to people with Opiate Use Disorders (OUD) seeking care. This is a front door to Medication Assisted Treatments (MAT), ages 16+  Walk In hours: Monday-Friday 9:00am-3:00pm    [facility]  [phone]  Walk in Assessments: Mon-Friday 7a-1:45p  [address], [place], [address]    [facility]  Central Access [phone]  [address] [place] [address], [address]  *by appointment only    Jean Carlos  [phone] (phone consultation available )  Locations in: [place], [place], [facility], and [place], [place]  Maltese virtual IOP [phone] or visit [email or web address]   Also offers LGBTQ programming     Simon Rodríguez Rudolph  [phone] [address]  [place], [place], [address]  *Currently only offered via telehealth - call to set up an appointment    [facility] Adult Mental [facility]  [address] [place], [place], [address]  [facility]  Recovery Program  For more information to to make a referral call:  [phone]  Walk-in on Fridays  9-11 a.m.    [facility]  [phone]  [address] [place], [place], [address]  *available by appointments only    [facility]  [phone] [address] [place], [place], [address]  *available by appointment only    [facility]  [phone]  [address] [place], [place], [address]  *walk in assessments available M-F starting at 7 am.    [facility]  [phone]  Locations: [place], [facility], [facility], and [place]  *Walk in assessments availble M-F starting at 8 am -virtual only    [facility]  [phone]  [address] [place] [address]    [facility]  Virtual + Locations: [place], [place], [place], [place], [facility], [facility], [place], [place]   [phone]  *available by appointment only    [facility]  [phone]  [address]  [place], [address]    [facility])  [phone]  [address] [place], [address]  *available by appointment    [facility]  [phone]  [address] [place], [place], [address]  *walk ins available M-TH from 9-3    [facility] program: [phone]  [address] [place], [place], [address]    [place]  [phone]  Same day substance use disorder assessments are available Monday - Friday, via walk-in or by appointment at the [place] location.  [facility], [address], [place] [address]     [facility] - adolescent and adult SUDs services  [phone]  Offer services Monday through Friday, as well as evening hours Monday through Thursday. Normally, a first appointment will be scheduled within one week  [email or web address]  Locations all over [place]    If you are intoxicated, you may be required to detox at a  detox facility before starting treatment. The following are detox facilities that you can self present to. All detox facilities are able to help you complete an assessment prior to discharge if you choose:    [facility]: Arrive at a [facility] for immediate medical evaluation    [facility]: [address] [place], [place], [address].         [phone]    [facility]: [address] [place], [place], [address]  [phone]     [facility] ([facility]): [address] [place], [place], [address]  [phone]     [facility]: [address] [place], [place], [address], [phone]         Ways to help cope with sobriety:    -- Take prescribed medicines as scheduled  -- Keep follow-up appointments  -- Talk to others about your concerns  -- Get regular exercise  -- Practice deep breathing skills  -- Eat a healthy diet  -- Use community resources, including hotline numbers, Critical access hospital crisis and support meetings  -- Stay sober and avoid places/people/things associated with substance use  --Maintain a daily schedule/routine  --Get at least 7-8 hours of sleep per night  --Create a list 10--20 healthy activities that you can do that are enjoyable and do not involve substance use  --Create daily goals (approx. 1-4 goals) per day and work to achieve them throughout the day.       Free Resources:    [place] Recovery [facility] ([facility])  [facility] connects people seeking recovery to resources that help foster and sustain long-term recovery. Whether you are seeking resources for treatment, transportation, housing, job training, education, health care or other pathways to recovery, [facility] is a great place to start.  Phone: 406.264.5402. www.[employer].youwho (Great listing of all types of recovery and non-recovery related resources)    Alcoholics Anonymous  Phone: 9-354-ALCOHOL  Website: HTTP://WWW.AA.ORG/  [facility] [place] (050-273-2501 or http://aaCrowdWorks.org)  KAVYA  "[place] (752-634-0948 or www.aastpau.org)     Narcotics Anonymous  Phone: 146.983.3239  Website: www.[employer].WorldEscape.    [facility]  [address] [place] [address], [address], [place], [place],  Phone: [phone]  Drop-in Hours: Monday-Friday 9-11:30 am. By appointment at other times.  Provides: [facility] is a drop-in center on the [place] side of [place] that provides a safe space for individuals who are homeless and have a history of chemical use. Sobriety is not a requirement but drugs and alcohol are not allowed on the property.  Services: Non-clients can access drop-in services such as Recovery and Harm Reduction Groups, referrals to case management, community activities, shower facilities, and a pool table. Individuals who are homeless and have chemical health needs may be eligible for enrollment into [facility]'s case management program. Clients and  work together to access benefits, treatment, health care, shelter, and external housing resources.        Crisis Lines  Crisis Text Line  Text 875202  You will be connected with a trained live crisis counselor to provide support.    Por espanol, texto  MAURI a 620729 o texto a 442-AYUDAME en WhatsApp    The Wilber Project (LGBTQ Youth Crisis Line)  5.621.253.4094  text START to 899-508      Community Resources  Fast Tracker  Linking people to mental health and substance use disorder resources  fasttrackermn.org     [place] Mental Health Warm Line  Peer to peer support  Monday thru Saturday, 12 pm to 10 pm  137.246.0982 or 1.919.231.0381  Text \"Support\" to 7[address]    National [place] on Mental Illness (SOLANGE)  266.003.2415 or 1.888.SOLANGE.HELPS      Mental Health Apps  My3  https://my3app.org/    VirtualHopeBox  https://[employer].org/apps/virtual-hope-box/      Additional Information  Today you were seen by a licensed mental health professional through Triage and Transition services, [facility] ([facility])  " for a crisis assessment in the Emergency Department at [facility].  It is recommended that you follow up with your established providers (psychiatrist, mental health therapist, and/or primary care doctor - as relevant) as soon as possible. Coordinators from [facility] will be calling you in the next 24-48 hours to ensure that you have the resources you need.  You can also contact [facility] coordinators directly at [phone]. You may have been scheduled for or offered an appointment with a mental health provider. [facility] maintains an extensive network of licensed behavioral health providers to connect patients with the services they need.  We do not charge providers a fee to participate in our referral network.  We match patients with providers based on a patient's specific needs, insurance coverage, and location.  Our first effort will be to refer you to a provider within your care system, and will utilize providers outside your care system as needed.

## 2023-09-27 NOTE — ED NOTES
Patient's guardian reports that the patient is currently homeless but may end up living with her for a period of time or his adult cousin.

## 2023-09-27 NOTE — ED PROVIDER NOTES
[facility] EMERGENCY DEPARTMENT ([facility])  [address]    History     Chief Complaint   Patient presents with    Hallucinations     Patient has been seeing and hearing demons. Has been off medications since February. Has a legal guardian - Manisha Calvert. Diagnosed schizophrenic.      ANDREAS Ruggiero is a 23 year old male with a past medical history significant for alcohol use disorder, cannabis use disorder, schizoaffective disorder, paranoia delusion and psychosis who presents to the Emergency Department for evaluation of increasing auditory and visual hallucinations, substance abuse, homelessness.  Patient states that due to his substance use he missed multiple psychiatric appointments, admits to scheduled antipsychotic Depo injection.  Has not had any medication since February.  Has been on the streets using primarily fentanyl, sometimes methamphetamine and some marijuana.  He last used yesterday morning.  Does not feel as though he is in withdrawal.  He has been more more depressed.  He is hearing and seeing demons and also hearing the voices of friends who have .  Denies any suicidal and denies any homicidal thoughts.  He called his legal guardian who brought him to the ED.      Past Medical History  Past Medical History:   Diagnosis Date    ADHD (attention deficit hyperactivity disorder)     Depression     Phimosis     PTSD (post-traumatic stress disorder)      Past Surgical History:   Procedure Laterality Date    CIRCUMCISION  2013    Procedure: CIRCUMCISION;  Circumcision;  Surgeon: Lynne Haro MD;  Location: [facility] OR    HERNIA REPAIR      Per patient, surgery was when he was 4 or 5 years of age     paliperidone ER (INVEGA) 6 MG 24 hr tablet  polyethylene glycol (MIRALAX) 17 GM/Dose powder  QUEtiapine (SEROQUEL) 100 MG tablet  INVEGA SUSTENNA 156 MG/ML ALANA injection  INVEGA SUSTENNA 234 MG/1.5ML ALANA      Allergies   Allergen Reactions    No Known Drug Allergy      Family  "History  Family History   Problem Relation Age of Onset    Mental Illness Mother     Intellectual Disability (Mental Retardation) Mother     Mental Illness Maternal Grandmother     Mental Illness Brother     Intellectual Disability (Mental Retardation) Brother     Unknown/Adopted Father      Social History   Social History     Tobacco Use    Smoking status: Every Day     Packs/day: 0.25     Years: 4.00     Pack years: 1.00     Types: Cigarettes    Smokeless tobacco: Never    Tobacco comments:     no tobacco - maijuana occassionally   Substance Use Topics    Alcohol use: Not Currently    Drug use: Yes     Types: Marijuana, Opiates     Comment: occassionally          A medically appropriate review of systems was performed with pertinent positives and negatives noted in the HPI, and all other systems negative.    Physical Exam   BP: 115/79  Pulse: 83  Temp: 98.9  F (37.2  C)  Resp: 16  Height: 188 cm (6' 2\")  Weight: 76.7 kg (169 lb)  SpO2: 100 %  Physical Exam  Vitals and nursing note reviewed.   Constitutional:       General: He is not in acute distress.     Appearance: Normal appearance. He is not toxic-appearing.   HENT:      Head: Atraumatic.   Eyes:      General: No scleral icterus.     Conjunctiva/sclera: Conjunctivae normal.   Cardiovascular:      Rate and Rhythm: Normal rate.      Heart sounds: Normal heart sounds.   Pulmonary:      Effort: Pulmonary effort is normal. No respiratory distress.      Breath sounds: Normal breath sounds.   Abdominal:      Palpations: Abdomen is soft.      Tenderness: There is no abdominal tenderness.   Musculoskeletal:         General: No deformity.      Cervical back: Neck supple.   Skin:     General: Skin is warm.   Neurological:      Mental Status: He is alert.   Psychiatric:         Attention and Perception: He is inattentive. He perceives auditory hallucinations.         Mood and Affect: Affect is flat.         Speech: Speech normal.         Behavior: Behavior is withdrawn. " Behavior is cooperative.         Thought Content: Thought content is paranoid and delusional. Thought content does not include homicidal or suicidal ideation.         Cognition and Memory: Cognition normal.           ED Course, Procedures, & Data      Procedures                     No results found for any visits on 09/27/23.  Medications - No data to display  Labs Ordered and Resulted from Time of ED Arrival to Time of ED Departure - No data to display  No orders to display          Critical care was not performed.     Medical Decision Making  The patient's presentation was of high complexity (a chronic illness severe exacerbation, progression, or side effect of treatment).    The patient's evaluation involved:  an assessment requiring an independent historian (legal guardian accompanies him and provides collateral information)  review of external note(s) from 2 sources (reviewed multiple ED visits in [facility] and [facility])  ordering and/or review of 1 test(s) in this encounter (urine tox screen ordered)  discussion of management or test interpretation with another health professional (DEC )    The patient's management necessitated moderate risk (prescription drug management including medications given in the ED) and high risk (a decision regarding hospitalization).    Assessment & Plan    Patient with a history of schizoaffective disorder undifferentiated, polysubstance abuse, homelessness, difficulty with medication compliance.  He presents having lost touch with his outpatient mental health providers, and is off medications.  Has been intermittently using substances including fentanyl, methamphetamine and marijuana but states he is not using on a daily basis.  He is having increasing auditory and visual hallucinations.  Like to reestablish psychiatric care.  He is not suicidal or homicidal.  The patient was also seen by the [facility] , please refer to their extensive note/evaluation which was reviewed  with me and is documented in EPIC on 9/27/2023 for further details.  Patient has been calm and cooperative here in the ED.  He does not wish to be hospitalized.  He makes clear denials of any plan or intention to harm himself or others.  He does admit to rather bizarre auditory and visual hallucinations including command hallucinations (though he says he has insight into the fact that these are not real and he would not act on them).  He is here with his legal guardian who knows him well.  They are requesting that we reestablish him with outpatient mental health providers and start medications.  I am in agreement.  I have written for paliperidone ER and Seroquel, and he has a psychiatry appointment within 48 hours.  We discussed the indications for emergency department return and follow-up.  Stable for discharge.      I have reviewed the nursing notes. I have reviewed the findings, diagnosis, plan and need for follow up with the patient.    New Prescriptions    PALIPERIDONE ER (INVEGA) 6 MG 24 HR TABLET    Take 1 tablet (6 mg) by mouth every morning    POLYETHYLENE GLYCOL (MIRALAX) 17 GM/DOSE POWDER    Take 17 g (1 Capful) by mouth daily for 30 days    QUETIAPINE (SEROQUEL) 100 MG TABLET    Take 1 tablet (100 mg) by mouth At Bedtime       Final diagnoses:   Schizoaffective disorder, unspecified type (H)   Polysubstance abuse (H)       Héctor Jones MD    [facility] EMERGENCY DEPARTMENT  9/27/2023     Héctor Jones MD  09/27/23 3029

## 2023-09-27 NOTE — ED TRIAGE NOTES
Patient presents with his legal guardian Manisha Calvert.     Patient reports he needs to be evaluated for his schizophrenia. Patient reports hearing and seeing demons. Patient has not had his IM medication since last February. Patient was part of the [facility] program in [place] due to using street drugs and missing appointments. Patient is now sober. Patient has been off of his medications. Reports history of fentanyl but is clean currently, not on Suboxone.     Patient denies SI or thoughts of harming self.       Patient is calm and cooperative in triage.

## 2023-09-29 ENCOUNTER — MEDICAL CORRESPONDENCE (OUTPATIENT)
Dept: HEALTH INFORMATION MANAGEMENT | Facility: CLINIC | Age: 23
End: 2023-09-29
Payer: COMMERCIAL

## 2024-06-22 ENCOUNTER — HEALTH MAINTENANCE LETTER (OUTPATIENT)
Age: 24
End: 2024-06-22

## 2025-04-03 NOTE — NURSING NOTE
Chief Complaint   Patient presents with     Recheck Medication     Schizoaffective disorder, depressive type    Reviewed allergies, smoking status, and pharmacy preference    Obtained weight, blood pressure and heart rate      Discharged

## 2025-07-12 ENCOUNTER — HEALTH MAINTENANCE LETTER (OUTPATIENT)
Age: 25
End: 2025-07-12